# Patient Record
Sex: FEMALE | Race: BLACK OR AFRICAN AMERICAN | Employment: UNEMPLOYED | URBAN - METROPOLITAN AREA
[De-identification: names, ages, dates, MRNs, and addresses within clinical notes are randomized per-mention and may not be internally consistent; named-entity substitution may affect disease eponyms.]

---

## 2021-09-21 ENCOUNTER — HOSPITAL ENCOUNTER (EMERGENCY)
Age: 71
Discharge: LEFT BEFORE DISCHARGE | End: 2021-09-21

## 2022-08-26 ENCOUNTER — HOSPITAL ENCOUNTER (INPATIENT)
Age: 72
LOS: 1 days | Discharge: HOME | DRG: 897 | End: 2022-08-27
Attending: EMERGENCY MEDICINE | Admitting: STUDENT IN AN ORGANIZED HEALTH CARE EDUCATION/TRAINING PROGRAM
Payer: MEDICARE

## 2022-08-26 ENCOUNTER — APPOINTMENT (OUTPATIENT)
Dept: GENERAL RADIOLOGY | Age: 72
DRG: 897 | End: 2022-08-26
Payer: MEDICARE

## 2022-08-26 DIAGNOSIS — E87.29 ALCOHOLIC KETOACIDOSIS: ICD-10-CM

## 2022-08-26 DIAGNOSIS — E16.2 HYPOGLYCEMIA: ICD-10-CM

## 2022-08-26 DIAGNOSIS — E87.6 HYPOKALEMIA: Primary | ICD-10-CM

## 2022-08-26 PROBLEM — F10.121 ALCOHOL INTOXICATION DELIRIUM (HCC): Status: ACTIVE | Noted: 2022-08-26

## 2022-08-26 PROBLEM — E16.1 HYPOGLYCEMIA, KETOTIC: Status: ACTIVE | Noted: 2022-08-26

## 2022-08-26 PROBLEM — F33.9 EPISODE OF RECURRENT MAJOR DEPRESSIVE DISORDER (HCC): Status: ACTIVE | Noted: 2022-08-26

## 2022-08-26 PROBLEM — F10.10 ALCOHOL ABUSE: Status: ACTIVE | Noted: 2022-08-26

## 2022-08-26 PROBLEM — S42.412A LEFT SUPRACONDYLAR HUMERUS FRACTURE: Status: ACTIVE | Noted: 2021-10-04

## 2022-08-26 LAB
ABSOLUTE EOS #: <0.03 K/UL (ref 0–0.44)
ABSOLUTE IMMATURE GRANULOCYTE: 0.07 K/UL (ref 0–0.3)
ABSOLUTE LYMPH #: 1.09 K/UL (ref 1.1–3.7)
ABSOLUTE MONO #: 0.9 K/UL (ref 0.1–1.2)
ALBUMIN SERPL-MCNC: 4.2 G/DL (ref 3.5–5.2)
ALBUMIN/GLOBULIN RATIO: 1.4 (ref 1–2.5)
ALP BLD-CCNC: 60 U/L (ref 35–104)
ALT SERPL-CCNC: 15 U/L (ref 5–33)
ANION GAP SERPL CALCULATED.3IONS-SCNC: 11 MMOL/L (ref 9–17)
ANION GAP SERPL CALCULATED.3IONS-SCNC: 23 MMOL/L (ref 9–17)
AST SERPL-CCNC: 41 U/L
BASOPHILS # BLD: 0 % (ref 0–2)
BASOPHILS ABSOLUTE: 0.04 K/UL (ref 0–0.2)
BETA-HYDROXYBUTYRATE: 1.46 MMOL/L (ref 0.02–0.27)
BILIRUB SERPL-MCNC: 0.19 MG/DL (ref 0.3–1.2)
BILIRUBIN DIRECT: 0.12 MG/DL
BILIRUBIN URINE: NEGATIVE
BILIRUBIN, INDIRECT: 0.07 MG/DL (ref 0–1)
BUN BLDV-MCNC: 14 MG/DL (ref 8–23)
BUN BLDV-MCNC: 5 MG/DL (ref 8–23)
CALCIUM SERPL-MCNC: 7.7 MG/DL (ref 8.6–10.4)
CALCIUM SERPL-MCNC: 8.2 MG/DL (ref 8.6–10.4)
CHLORIDE BLD-SCNC: 102 MMOL/L (ref 98–107)
CHLORIDE BLD-SCNC: 105 MMOL/L (ref 98–107)
CO2: 13 MMOL/L (ref 20–31)
CO2: 22 MMOL/L (ref 20–31)
COLOR: YELLOW
COMMENT UA: ABNORMAL
CORTISOL: 26.3 UG/DL (ref 2.7–18.4)
CREAT SERPL-MCNC: 0.58 MG/DL (ref 0.5–0.9)
CREAT SERPL-MCNC: 0.7 MG/DL (ref 0.5–0.9)
EOSINOPHILS RELATIVE PERCENT: 0 % (ref 1–4)
ETHANOL PERCENT: 0.09 %
ETHANOL: 91 MG/DL
GFR AFRICAN AMERICAN: >60 ML/MIN
GFR AFRICAN AMERICAN: >60 ML/MIN
GFR NON-AFRICAN AMERICAN: >60 ML/MIN
GFR NON-AFRICAN AMERICAN: >60 ML/MIN
GFR SERPL CREATININE-BSD FRML MDRD: ABNORMAL ML/MIN/{1.73_M2}
GFR SERPL CREATININE-BSD FRML MDRD: ABNORMAL ML/MIN/{1.73_M2}
GLUCOSE BLD-MCNC: 116 MG/DL (ref 65–105)
GLUCOSE BLD-MCNC: 123 MG/DL (ref 65–105)
GLUCOSE BLD-MCNC: 143 MG/DL (ref 70–99)
GLUCOSE BLD-MCNC: 152 MG/DL (ref 65–105)
GLUCOSE BLD-MCNC: 157 MG/DL (ref 70–99)
GLUCOSE BLD-MCNC: 175 MG/DL (ref 65–105)
GLUCOSE URINE: ABNORMAL
HCT VFR BLD CALC: 37.5 % (ref 36.3–47.1)
HEMOGLOBIN: 11.3 G/DL (ref 11.9–15.1)
IMMATURE GRANULOCYTES: 1 %
INR BLD: 1
KETONES, URINE: ABNORMAL
LACTIC ACID, WHOLE BLOOD: 1.4 MMOL/L (ref 0.7–2.1)
LACTIC ACID, WHOLE BLOOD: 2 MMOL/L (ref 0.7–2.1)
LACTIC ACID, WHOLE BLOOD: 6.5 MMOL/L (ref 0.7–2.1)
LEUKOCYTE ESTERASE, URINE: NEGATIVE
LIPASE: 15 U/L (ref 13–60)
LYMPHOCYTES # BLD: 8 % (ref 24–43)
MAGNESIUM: 2.3 MG/DL (ref 1.6–2.6)
MCH RBC QN AUTO: 30.3 PG (ref 25.2–33.5)
MCHC RBC AUTO-ENTMCNC: 30.1 G/DL (ref 28.4–34.8)
MCV RBC AUTO: 100.5 FL (ref 82.6–102.9)
MONOCYTES # BLD: 6 % (ref 3–12)
NITRITE, URINE: NEGATIVE
NRBC AUTOMATED: 0 PER 100 WBC
PDW BLD-RTO: 14.6 % (ref 11.8–14.4)
PH UA: 5 (ref 5–8)
PLATELET # BLD: 334 K/UL (ref 138–453)
PMV BLD AUTO: 9.3 FL (ref 8.1–13.5)
POTASSIUM SERPL-SCNC: 2.7 MMOL/L (ref 3.7–5.3)
POTASSIUM SERPL-SCNC: 4.4 MMOL/L (ref 3.7–5.3)
PROTEIN UA: NEGATIVE
PROTHROMBIN TIME: 10.7 SEC (ref 9.1–12.3)
RBC # BLD: 3.73 M/UL (ref 3.95–5.11)
RBC # BLD: ABNORMAL 10*6/UL
SEG NEUTROPHILS: 85 % (ref 36–65)
SEGMENTED NEUTROPHILS ABSOLUTE COUNT: 12.03 K/UL (ref 1.5–8.1)
SODIUM BLD-SCNC: 135 MMOL/L (ref 135–144)
SODIUM BLD-SCNC: 141 MMOL/L (ref 135–144)
SPECIFIC GRAVITY UA: 1.01 (ref 1–1.03)
TOTAL PROTEIN: 7.1 G/DL (ref 6.4–8.3)
TSH SERPL DL<=0.05 MIU/L-ACNC: 0.54 UIU/ML (ref 0.3–5)
TURBIDITY: CLEAR
URINE HGB: NEGATIVE
UROBILINOGEN, URINE: NORMAL
WBC # BLD: 14.1 K/UL (ref 3.5–11.3)

## 2022-08-26 PROCEDURE — 71045 X-RAY EXAM CHEST 1 VIEW: CPT

## 2022-08-26 PROCEDURE — 82947 ASSAY GLUCOSE BLOOD QUANT: CPT

## 2022-08-26 PROCEDURE — 99223 1ST HOSP IP/OBS HIGH 75: CPT | Performed by: FAMILY MEDICINE

## 2022-08-26 PROCEDURE — 6370000000 HC RX 637 (ALT 250 FOR IP): Performed by: STUDENT IN AN ORGANIZED HEALTH CARE EDUCATION/TRAINING PROGRAM

## 2022-08-26 PROCEDURE — 2500000003 HC RX 250 WO HCPCS: Performed by: FAMILY MEDICINE

## 2022-08-26 PROCEDURE — 84443 ASSAY THYROID STIM HORMONE: CPT

## 2022-08-26 PROCEDURE — 82010 KETONE BODYS QUAN: CPT

## 2022-08-26 PROCEDURE — 2580000003 HC RX 258: Performed by: STUDENT IN AN ORGANIZED HEALTH CARE EDUCATION/TRAINING PROGRAM

## 2022-08-26 PROCEDURE — 83605 ASSAY OF LACTIC ACID: CPT

## 2022-08-26 PROCEDURE — 85610 PROTHROMBIN TIME: CPT

## 2022-08-26 PROCEDURE — 96374 THER/PROPH/DIAG INJ IV PUSH: CPT

## 2022-08-26 PROCEDURE — 1200000000 HC SEMI PRIVATE

## 2022-08-26 PROCEDURE — 2580000003 HC RX 258: Performed by: HEALTH CARE PROVIDER

## 2022-08-26 PROCEDURE — 80076 HEPATIC FUNCTION PANEL: CPT

## 2022-08-26 PROCEDURE — 80048 BASIC METABOLIC PNL TOTAL CA: CPT

## 2022-08-26 PROCEDURE — 87086 URINE CULTURE/COLONY COUNT: CPT

## 2022-08-26 PROCEDURE — 94761 N-INVAS EAR/PLS OXIMETRY MLT: CPT

## 2022-08-26 PROCEDURE — 96375 TX/PRO/DX INJ NEW DRUG ADDON: CPT

## 2022-08-26 PROCEDURE — 83690 ASSAY OF LIPASE: CPT

## 2022-08-26 PROCEDURE — 6370000000 HC RX 637 (ALT 250 FOR IP): Performed by: FAMILY MEDICINE

## 2022-08-26 PROCEDURE — 6360000002 HC RX W HCPCS: Performed by: FAMILY MEDICINE

## 2022-08-26 PROCEDURE — 83735 ASSAY OF MAGNESIUM: CPT

## 2022-08-26 PROCEDURE — G0480 DRUG TEST DEF 1-7 CLASSES: HCPCS

## 2022-08-26 PROCEDURE — 81003 URINALYSIS AUTO W/O SCOPE: CPT

## 2022-08-26 PROCEDURE — 85025 COMPLETE CBC W/AUTO DIFF WBC: CPT

## 2022-08-26 PROCEDURE — 82533 TOTAL CORTISOL: CPT

## 2022-08-26 PROCEDURE — 6360000002 HC RX W HCPCS: Performed by: STUDENT IN AN ORGANIZED HEALTH CARE EDUCATION/TRAINING PROGRAM

## 2022-08-26 PROCEDURE — 6360000002 HC RX W HCPCS: Performed by: EMERGENCY MEDICINE

## 2022-08-26 PROCEDURE — 36415 COLL VENOUS BLD VENIPUNCTURE: CPT

## 2022-08-26 PROCEDURE — 99285 EMERGENCY DEPT VISIT HI MDM: CPT

## 2022-08-26 RX ORDER — DEXTROSE MONOHYDRATE 100 MG/ML
INJECTION, SOLUTION INTRAVENOUS CONTINUOUS PRN
Status: DISCONTINUED | OUTPATIENT
Start: 2022-08-26 | End: 2022-08-27 | Stop reason: HOSPADM

## 2022-08-26 RX ORDER — MAGNESIUM SULFATE 1 G/100ML
1000 INJECTION INTRAVENOUS PRN
Status: DISCONTINUED | OUTPATIENT
Start: 2022-08-26 | End: 2022-08-27 | Stop reason: HOSPADM

## 2022-08-26 RX ORDER — DEXTROSE, SODIUM CHLORIDE, AND POTASSIUM CHLORIDE 5; .45; .15 G/100ML; G/100ML; G/100ML
INJECTION INTRAVENOUS CONTINUOUS
Status: DISCONTINUED | OUTPATIENT
Start: 2022-08-26 | End: 2022-08-27

## 2022-08-26 RX ORDER — SODIUM CHLORIDE 0.9 % (FLUSH) 0.9 %
10 SYRINGE (ML) INJECTION PRN
Status: DISCONTINUED | OUTPATIENT
Start: 2022-08-26 | End: 2022-08-27 | Stop reason: HOSPADM

## 2022-08-26 RX ORDER — ENOXAPARIN SODIUM 100 MG/ML
40 INJECTION SUBCUTANEOUS DAILY
Status: DISCONTINUED | OUTPATIENT
Start: 2022-08-26 | End: 2022-08-27 | Stop reason: HOSPADM

## 2022-08-26 RX ORDER — POTASSIUM CHLORIDE 7.45 MG/ML
40 INJECTION INTRAVENOUS ONCE
Status: COMPLETED | OUTPATIENT
Start: 2022-08-26 | End: 2022-08-26

## 2022-08-26 RX ORDER — ONDANSETRON 2 MG/ML
4 INJECTION INTRAMUSCULAR; INTRAVENOUS EVERY 6 HOURS PRN
Status: DISCONTINUED | OUTPATIENT
Start: 2022-08-26 | End: 2022-08-27 | Stop reason: HOSPADM

## 2022-08-26 RX ORDER — POTASSIUM CHLORIDE 20 MEQ/1
40 TABLET, EXTENDED RELEASE ORAL PRN
Status: DISCONTINUED | OUTPATIENT
Start: 2022-08-26 | End: 2022-08-27 | Stop reason: HOSPADM

## 2022-08-26 RX ORDER — POTASSIUM CHLORIDE 7.45 MG/ML
10 INJECTION INTRAVENOUS PRN
Status: DISCONTINUED | OUTPATIENT
Start: 2022-08-26 | End: 2022-08-27 | Stop reason: HOSPADM

## 2022-08-26 RX ORDER — SERTRALINE HYDROCHLORIDE 25 MG/1
25 TABLET, FILM COATED ORAL DAILY
Status: DISCONTINUED | OUTPATIENT
Start: 2022-08-26 | End: 2022-08-27 | Stop reason: HOSPADM

## 2022-08-26 RX ORDER — SENNOSIDES 8.6 MG
650 CAPSULE ORAL EVERY 8 HOURS PRN
Status: ON HOLD | COMMUNITY
End: 2022-08-27 | Stop reason: HOSPADM

## 2022-08-26 RX ORDER — SODIUM CHLORIDE 0.9 % (FLUSH) 0.9 %
5-40 SYRINGE (ML) INJECTION EVERY 12 HOURS SCHEDULED
Status: DISCONTINUED | OUTPATIENT
Start: 2022-08-26 | End: 2022-08-27 | Stop reason: HOSPADM

## 2022-08-26 RX ORDER — SODIUM CHLORIDE 9 MG/ML
INJECTION, SOLUTION INTRAVENOUS PRN
Status: DISCONTINUED | OUTPATIENT
Start: 2022-08-26 | End: 2022-08-27 | Stop reason: HOSPADM

## 2022-08-26 RX ORDER — ACETAMINOPHEN 325 MG/1
650 TABLET ORAL EVERY 6 HOURS PRN
Status: DISCONTINUED | OUTPATIENT
Start: 2022-08-26 | End: 2022-08-27 | Stop reason: HOSPADM

## 2022-08-26 RX ORDER — LORAZEPAM 0.5 MG/1
0.5 TABLET ORAL EVERY 4 HOURS PRN
Status: DISCONTINUED | OUTPATIENT
Start: 2022-08-26 | End: 2022-08-27 | Stop reason: HOSPADM

## 2022-08-26 RX ORDER — ACETAMINOPHEN 650 MG/1
650 SUPPOSITORY RECTAL EVERY 6 HOURS PRN
Status: DISCONTINUED | OUTPATIENT
Start: 2022-08-26 | End: 2022-08-27 | Stop reason: HOSPADM

## 2022-08-26 RX ORDER — ALBUTEROL SULFATE 90 UG/1
2 AEROSOL, METERED RESPIRATORY (INHALATION) EVERY 6 HOURS PRN
Status: ON HOLD | COMMUNITY
End: 2022-08-27 | Stop reason: HOSPADM

## 2022-08-26 RX ORDER — 0.9 % SODIUM CHLORIDE 0.9 %
1000 INTRAVENOUS SOLUTION INTRAVENOUS ONCE
Status: COMPLETED | OUTPATIENT
Start: 2022-08-26 | End: 2022-08-26

## 2022-08-26 RX ORDER — MAGNESIUM SULFATE IN WATER 40 MG/ML
2000 INJECTION, SOLUTION INTRAVENOUS ONCE
Status: COMPLETED | OUTPATIENT
Start: 2022-08-26 | End: 2022-08-26

## 2022-08-26 RX ORDER — ONDANSETRON 2 MG/ML
4 INJECTION INTRAMUSCULAR; INTRAVENOUS ONCE
Status: COMPLETED | OUTPATIENT
Start: 2022-08-26 | End: 2022-08-26

## 2022-08-26 RX ORDER — SODIUM CHLORIDE, SODIUM LACTATE, POTASSIUM CHLORIDE, AND CALCIUM CHLORIDE .6; .31; .03; .02 G/100ML; G/100ML; G/100ML; G/100ML
1000 INJECTION, SOLUTION INTRAVENOUS ONCE
Status: COMPLETED | OUTPATIENT
Start: 2022-08-26 | End: 2022-08-26

## 2022-08-26 RX ORDER — POLYETHYLENE GLYCOL 3350 17 G/17G
17 POWDER, FOR SOLUTION ORAL DAILY PRN
Status: DISCONTINUED | OUTPATIENT
Start: 2022-08-26 | End: 2022-08-27 | Stop reason: HOSPADM

## 2022-08-26 RX ORDER — ONDANSETRON 4 MG/1
4 TABLET, ORALLY DISINTEGRATING ORAL EVERY 8 HOURS PRN
Status: DISCONTINUED | OUTPATIENT
Start: 2022-08-26 | End: 2022-08-27 | Stop reason: HOSPADM

## 2022-08-26 RX ADMIN — ENOXAPARIN SODIUM 40 MG: 100 INJECTION SUBCUTANEOUS at 12:11

## 2022-08-26 RX ADMIN — ONDANSETRON 4 MG: 2 INJECTION INTRAMUSCULAR; INTRAVENOUS at 08:28

## 2022-08-26 RX ADMIN — SODIUM CHLORIDE 1000 ML: 9 INJECTION, SOLUTION INTRAVENOUS at 07:33

## 2022-08-26 RX ADMIN — POTASSIUM CHLORIDE, DEXTROSE MONOHYDRATE AND SODIUM CHLORIDE: 150; 5; 450 INJECTION, SOLUTION INTRAVENOUS at 13:13

## 2022-08-26 RX ADMIN — POTASSIUM BICARBONATE 40 MEQ: 782 TABLET, EFFERVESCENT ORAL at 07:28

## 2022-08-26 RX ADMIN — POTASSIUM CHLORIDE 40 MEQ: 7.46 INJECTION, SOLUTION INTRAVENOUS at 07:34

## 2022-08-26 RX ADMIN — MAGNESIUM SULFATE HEPTAHYDRATE 2000 MG: 40 INJECTION, SOLUTION INTRAVENOUS at 07:32

## 2022-08-26 RX ADMIN — SERTRALINE 25 MG: 25 TABLET, FILM COATED ORAL at 12:54

## 2022-08-26 RX ADMIN — POTASSIUM CHLORIDE, DEXTROSE MONOHYDRATE AND SODIUM CHLORIDE: 150; 5; 450 INJECTION, SOLUTION INTRAVENOUS at 22:56

## 2022-08-26 RX ADMIN — SODIUM CHLORIDE, POTASSIUM CHLORIDE, SODIUM LACTATE AND CALCIUM CHLORIDE 1000 ML: 600; 310; 30; 20 INJECTION, SOLUTION INTRAVENOUS at 09:50

## 2022-08-26 ASSESSMENT — ENCOUNTER SYMPTOMS
ABDOMINAL PAIN: 0
BLOOD IN STOOL: 0
WHEEZING: 0
EYE ITCHING: 0
VOMITING: 0
SINUS PRESSURE: 0
COUGH: 0
EYE PAIN: 0
RHINORRHEA: 0
NAUSEA: 0
SHORTNESS OF BREATH: 0
SORE THROAT: 0
CHEST TIGHTNESS: 0
CONSTIPATION: 0
DIARRHEA: 0
SINUS PAIN: 0
ABDOMINAL DISTENTION: 0

## 2022-08-26 ASSESSMENT — PAIN - FUNCTIONAL ASSESSMENT
PAIN_FUNCTIONAL_ASSESSMENT: NONE - DENIES PAIN
PAIN_FUNCTIONAL_ASSESSMENT: NONE - DENIES PAIN

## 2022-08-26 ASSESSMENT — PAIN SCALES - GENERAL
PAINLEVEL_OUTOF10: 0
PAINLEVEL_OUTOF10: 0

## 2022-08-26 NOTE — PLAN OF CARE
MICU consulted for high lactate. Patient presenting for \"hypoglycemia and alcohol intoxication. \"    Patient reports she collapsed on the ground last night and EMS was called, has never had any previous issues with her blood sugar or falls. Admits to drinking 3 beers daily. Denies any current pain or complaints. Is appropriately concerned about the syncopal-like episode. Vitals:    08/26/22 0830   BP: 118/65   Pulse: 81   Resp: 15   Temp:    SpO2: 100%   T 97.6F    GENERAL: No acute distress, alert, cooperative, oriented x 4  HEENT: normocephalic, atraumatic. Anicteric sclerae, normal conjunctiva. Neck supple. CV: Regular rate, no murmurs, rubs, or gallops  Pulm: CTAB, no accessory muscle use, mild wheezing, no respiratory distress. ABD: Soft, nondistended, mild rlq pain, asking for food  Neuro: CN2-12 grossly intact, oriented x 4, alert, sensation intact across extremities. MSK:  no deformities or injuries, moves all extremities equally  Skin: Warm, dry, cap refill 2-3 seconds  Psych: Normal mood      Recommending patient be admitted to step down  Repeat lactate planned, not collected. D/w intermed attending.     Cal Howell DO

## 2022-08-26 NOTE — ED PROVIDER NOTES
[facility]  Emergency Department  Emergency Medicine Resident Sign-out     Care of Alethia Galeazzi was assumed from Dr. Stephani Urrutia and is being seen for Hypoglycemia and Alcohol Intoxication  . The patient's initial evaluation and plan have been discussed with the prior provider who initially evaluated the patient.      EMERGENCY DEPARTMENT COURSE / MEDICAL DECISION MAKING:       MEDICATIONS GIVEN:  Orders Placed This Encounter   Medications    glucose chewable tablet 16 g    OR Linked Order Group     dextrose bolus 10% 125 mL     dextrose bolus 10% 250 mL    glucagon (rDNA) injection 1 mg    dextrose 10 % infusion    potassium bicarb-citric acid (EFFER-K) effervescent tablet 40 mEq    potassium chloride 10 mEq/100 mL IVPB (Peripheral Line)    magnesium sulfate 2000 mg in 50 mL IVPB premix    ondansetron (ZOFRAN) injection 4 mg       LABS / RADIOLOGY:     Labs Reviewed   CBC WITH AUTO DIFFERENTIAL - Abnormal; Notable for the following components:       Result Value    WBC 14.1 (*)     RBC 3.73 (*)     Hemoglobin 11.3 (*)     RDW 14.6 (*)     Seg Neutrophils 85 (*)     Lymphocytes 8 (*)     Eosinophils % 0 (*)     Immature Granulocytes 1 (*)     Segs Absolute 12.03 (*)     Absolute Lymph # 1.09 (*)     All other components within normal limits   BASIC METABOLIC PANEL - Abnormal; Notable for the following components:    Glucose 143 (*)     Calcium 8.2 (*)     Potassium 2.7 (*)     CO2 13 (*)     Anion Gap 23 (*)     All other components within normal limits   ETHANOL - Abnormal; Notable for the following components:    Ethanol 91 (*)     Ethanol percent 0.091 (*)     All other components within normal limits   BETA-HYDROXYBUTYRATE - Abnormal; Notable for the following components:    Beta-Hydroxybutyrate 1.46 (*)     All other components within normal limits   POC GLUCOSE FINGERSTICK - Abnormal; Notable for the following components:    POC Glucose 175 (*)     All other components within normal limits   TSH WITH REFLEX   URINALYSIS WITH REFLEX TO CULTURE   KETONES, URINE   CORTISOL TOTAL   HEPATIC FUNCTION PANEL   PROTIME-INR   LIPASE       No results found. RECENT VITALS:     Temp: 97.6 °F (36.4 °C),  Heart Rate: 80, Resp: 18, BP: 120/68, SpO2: 97 %      This patient is a 81 y.o. Female with altered mental status. Patient lives with her daughter who reported that the patient was less responsive and acting out of character. She called EMS and the patient was found to have critically low blood sugar. Her mentation improved significantly with IV thiamine and dextrose. She arrived with no complaints. Blood sugar was 175. She has no history of diabetes or hypoglycemic episodes. History of alcohol use. Reports very little food intake yesterday. ED work-up demonstrates critical hypokalemia. She is being replenished with 40 mEq p.o. and 40 mEq IV. Will require admission for critical hypokalemia and management of alcoholic ketoacidosis. ED Course as of 08/26/22 1116   Fri Aug 26, 2022   0627 POC Glucose(!): 175 [LR]   0649 WBC(!): 14.1 [LR]   0649 Hemoglobin Quant(!): 11.3 [LR]   0700 Potassium(!!): 2.7 [LR]   0700 Beta-Hydroxybutyrate(!): 1.46 [LR]      ED Course User Index  [LR] Florinda Pagan,        OUTSTANDING TASKS / RECOMMENDATIONS:    F/u additional labs  Awaiting Intermed  Dispo     FINAL IMPRESSION:     1. Hypokalemia    2. Hypoglycemia    3. Alcoholic ketoacidosis        DISPOSITION:         DISPOSITION:  []  Discharge   []  Transfer -    [x]  Admission -     []  Against Medical Advice   []  Eloped   FOLLOW-UP: No follow-up provider specified.    DISCHARGE MEDICATIONS: New Prescriptions    No medications on file          Lupe Smith MD  Emergency Medicine Resident  [address] [facility]       Lupe Smith MD  Resident  08/26/22 4692

## 2022-08-26 NOTE — PROGRESS NOTES
Advance Care Planning     Advance Care Planning Inpatient Note  [facility] Department    Today's Date: 8/26/2022  Unit: [facility]    Received request from Other:  . Upon review of chart and communication with care team, patient's decision making abilities are not in question. . Patient was/were present in the room during visit. Goals of ACP Conversation:  Discuss advance care planning documents  Facilitate a discussion related to patient's goals of care as they align with the patient's values and beliefs. Health Care Decision Makers:       Primary Decision Maker: Cheikh Valencia - Child - [phone]  Summary:  Completed New Documents    Advance Care Planning Documents (Patient Wishes):  Healthcare Power of /Advance Directive Appointment of Health Care Agent     Assessment:  Pt awake in ED bed and welcoming of visit. Pt was able to convey her wishes. She was uncertain of her children's phone #s so this was confirmed by Karyn Travis. .    Interventions:  Provided education on documents for clarity and greater understanding  Assisted in the completion of documents according to patient's wishes at this time  Encouraged ongoing ACP conversation with future decision makers and loved ones    Care Preferences Communicated:   Pt wants \"whatever can be done. \"    Outcomes/Plan:  ACP Discussion: Completed  New advance directive completed. Returned original document(s) to patient, as well as copies for distribution to appointed agents  Copy of advance directive given to staff to scan into medical record.     Electronically signed by Chaplain Maria Dolores on 8/26/2022 at 11:24 AM

## 2022-08-26 NOTE — ED NOTES
3rd bag of Potassium started at 9AM, patient is awake. HR is normal range, no chest pain.      Amber Olivas RN  08/26/22 2403

## 2022-08-26 NOTE — ED NOTES
Meal served at 1300hrs however, the patient consumed only about 10% of the meal. She had little appetite and doesn't really like the meal.     Pati Bingham RN  08/26/22 2835
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
## 2022-08-26 NOTE — ED NOTES
2nd  Bag Potassium IV started at 8:40AM HR is at 77bpm, patient is asleep.      Kirti Wild RN  08/26/22 6804

## 2022-08-26 NOTE — CARE COORDINATION
08/26/22 0910   Service Assessment   Patient Orientation Alert and Oriented   Cognition Alert   History Provided By Patient   Primary Caregiver [facility]  (pts daughter and nephew lives in pts home)   PCP Verified by CM Yes  (5 months ago)   Last Visit to PCP Within last 6 months   Prior Functional Level Independent in ADLs/IADLs   Current Functional Level Independent in ADLs/IADLs   Can patient return to prior living arrangement Yes   Ability to make needs known: Good   Family able to assist with home care needs: Yes   Would you like for me to discuss the discharge plan with any other family members/significant others, and if so, who? No   Financial Resources Medicare   Social/Functional History   Lives With Daughter   Type of [address] One level   [address] to enter with rails   Entrance Stairs - Number of Steps 5 steps, one rail   Bathroom Shower/Tub Tub/Shower unit   Bathroom Toilet Standard   Bathroom Accessibility Not accessible   [place]   Homemaking Responsibilities Yes   Ambulation Assistance Independent   Transfer Assistance Independent   Active  No   Patient's  Info children provide transportation although the pt can   Mode of Transportation Car;Truck   Occupation Unemployed   Discharge Planning   Type of [address] Prior To Admission None   Potential Assistance Needed N/A   DME Ordered? No   Potential Assistance Purchasing Medications No  (fills meds at [facility])   Type of [address] None   Patient expects to be discharged to: House   One/Two Story Residence One story   History of falls?  0   Services At/After Discharge   Services At/After Discharge None   Mode of Transport at Discharge   (family to provide transportation home)   Condition of Participation: Discharge Planning   The Plan for Transition of Care is related to the following treatment goals: comfort

## 2022-08-26 NOTE — ED PROVIDER NOTES
[facility]MED SURG  Emergency Department Encounter  Emergency Medicine Resident     Pt Isabell Figueroa  MRN: [id number]  [place] 1950  Date of evaluation: 8/26/22  PCP:  [address]       Chief Complaint   Patient presents with    Hypoglycemia    Alcohol Intoxication       HISTORY OF PRESENT ILLNESS  (Location/Symptom, Timing/Onset, Context/Setting, Quality, Duration, Modifying Factors, Severity.)      Negrito Zambrano is a 81 y.o. female who presents with hypoglycemia. Patient lives with her daughter and her daughter called EMS for altered mental status and decreased responsiveness. Patient has a history of alcohol use. Reports that she drank vodka earlier in the day and had very little p.o. intake of food throughout the day. Later in the evening she became altered and EMS was called. Thiamine was administered and she was given an amp of dextrose with significant improvement in mentation. She had been well throughout the day up until this point. The patient is alert and oriented on arrival and states that she does not have any significant medical conditions and does not take any medications at home. She does not have diabetes and has never had any episode of hypoglycemia. PAST MEDICAL / SURGICAL / SOCIAL / FAMILY HISTORY      has a past medical history of Alcohol abuse and Left supracondylar humerus fracture. has a past surgical history that includes Humerus fracture surgery (Left). Social History     Socioeconomic History    Marital status:      Spouse name: Not on file    Number of children: Not on file    Years of education: Not on file    Highest education level: Not on file   Occupational History    Not on file   Tobacco Use    Smoking status: Former     Types: Cigarettes    Smokeless tobacco: Never   Substance and Sexual Activity    Alcohol use:  Yes     Alcohol/week: 6.0 standard drinks     Types: 2 Cans of beer, 4 Shots of liquor per week    Drug use: Not Currently    Sexual activity: Not on file   Other Topics Concern    Not on file   Social History Narrative    Lives along with her daughter and nephew. Social Determinants of Health     Financial Resource Strain: Not on file   Food Insecurity: Not on file   Transportation Needs: Not on file   Physical Activity: Not on file   Stress: Not on file   Social Connections: Not on file   Intimate Partner Violence: Not on file   Housing Stability: Not on file       Family History   Problem Relation Age of Onset    Cirrhosis Father        Allergies:  Codeine    Home Medications:  Prior to Admission medications    Medication Sig Start Date End Date Taking? Authorizing Provider   gabapentin (NEURONTIN) 300 MG capsule Take 1 capsule by mouth 2 times daily for 180 days. Intended supply: 90 days 8/27/22 2/23/23 Yes Patricia Greenberg MD   albuterol sulfate HFA (PROVENTIL;VENTOLIN;PROAIR) 108 (90 Base) MCG/ACT inhaler Inhale 2 puffs into the lungs every 6 hours as needed  8/27/22  Historical Provider, MD   acetaminophen (TYLENOL) 650 MG extended release tablet Take 650 mg by mouth every 8 hours as needed  8/27/22  Historical Provider, MD       REVIEW OF SYSTEMS    (2-9 systems for level 4, 10 or more for level 5)      Review of Systems   Constitutional:  Negative for activity change, chills and fever. HENT:  Negative for congestion, sinus pressure, sinus pain and sore throat. Eyes:  Negative for pain and itching. Respiratory:  Negative for cough and shortness of breath. Cardiovascular:  Negative for chest pain. Gastrointestinal:  Negative for abdominal distention, abdominal pain, constipation, diarrhea and nausea. Endocrine: Negative for polyuria. Genitourinary:  Negative for dysuria and frequency. Musculoskeletal:  Negative for arthralgias. Skin:  Negative for rash. Neurological:  Negative for light-headedness and headaches.      PHYSICAL EXAM   (up to 7 for level 4, 8 or more for level 5)      INITIAL VITALS:   BP (!) 149/79   Pulse 80   Temp 98.1 °F (36.7 °C) (Oral)   Resp 16   Ht 5' 4\" (1.626 m)   Wt 107 lb (48.5 kg)   SpO2 99%   BMI 18.37 kg/m²     Physical Exam  Vitals reviewed. Constitutional:       General: She is not in acute distress. HENT:      Head: Normocephalic and atraumatic. Ears:      Comments: Hearing grossly normal     Nose: Nose normal.      Mouth/Throat:      Mouth: Mucous membranes are moist.      Pharynx: Oropharynx is clear. Eyes:      General: No scleral icterus. Conjunctiva/sclera: Conjunctivae normal.      Pupils: Pupils are equal, round, and reactive to light. Cardiovascular:      Rate and Rhythm: Normal rate and regular rhythm. Pulses: Normal pulses. Pulmonary:      Effort: Pulmonary effort is normal. No respiratory distress. Breath sounds: Normal breath sounds. Abdominal:      General: There is no distension. Palpations: Abdomen is soft. Tenderness: There is no abdominal tenderness. There is no guarding. Musculoskeletal:      Cervical back: No muscular tenderness. Right lower leg: No edema. Left lower leg: No edema. Skin:     General: Skin is warm and dry. Capillary Refill: Capillary refill takes less than 2 seconds. Neurological:      General: No focal deficit present. Mental Status: She is alert and oriented to person, place, and time. Mental status is at baseline.       Comments: No facial droop, 5/5 strength throughout, GCS 15, A&O x4,        DIFFERENTIAL  DIAGNOSIS     PLAN (LABS / IMAGING / EKG):  Orders Placed This Encounter   Procedures    Culture, Urine    XR CHEST PORTABLE    CBC with Auto Differential    Basic Metabolic Panel    Ethanol    Beta-Hydroxybutyrate    TSH with Reflex    Urinalysis with Reflex to Culture    Cortisol Total    Hepatic Function Panel    Protime-INR    Lipase    Lactic Acid    Magnesium    Basic Metabolic Panel w/ Reflex to MG    Lactic Acid Protime-INR    Height and weight    Inpatient consult to Internal Medicine    Inpatient consult to Critical Care    POC Glucose Fingerstick    POC Glucose Fingerstick    POC Glucose Fingerstick    POC Glucose Fingerstick    POC Glucose Fingerstick    ADMIT TO INPATIENT    Discharge patient       MEDICATIONS ORDERED:  Orders Placed This Encounter   Medications    DISCONTD: glucose chewable tablet 16 g    DISCONTD: dextrose bolus 10% 125 mL    DISCONTD: dextrose bolus 10% 250 mL    DISCONTD: glucagon (rDNA) injection 1 mg    DISCONTD: dextrose 10 % infusion    DISCONTD: potassium bicarb-citric acid (EFFER-K) effervescent tablet 40 mEq    potassium chloride 10 mEq/100 mL IVPB (Peripheral Line)    magnesium sulfate 2000 mg in 50 mL IVPB premix    ondansetron (ZOFRAN) injection 4 mg    0.9 % sodium chloride bolus    lactated ringers bolus    DISCONTD: sodium chloride flush 0.9 % injection 5-40 mL    DISCONTD: sodium chloride flush 0.9 % injection 10 mL    DISCONTD: 0.9 % sodium chloride infusion    DISCONTD: potassium chloride (KLOR-CON M) extended release tablet 40 mEq    DISCONTD: potassium bicarb-citric acid (EFFER-K) effervescent tablet 40 mEq    DISCONTD: potassium chloride 10 mEq/100 mL IVPB (Peripheral Line)    DISCONTD: magnesium sulfate 1000 mg in dextrose 5% 100 mL IVPB    DISCONTD: enoxaparin (LOVENOX) injection 40 mg     Order Specific Question:   Indication of Use     Answer:   Prophylaxis-DVT/PE    DISCONTD: ondansetron (ZOFRAN-ODT) disintegrating tablet 4 mg    DISCONTD: ondansetron (ZOFRAN) injection 4 mg    DISCONTD: polyethylene glycol (GLYCOLAX) packet 17 g    DISCONTD: acetaminophen (TYLENOL) tablet 650 mg    DISCONTD: acetaminophen (TYLENOL) suppository 650 mg    DISCONTD: dextrose 5 % and 0.45 % NaCl with KCl 20 mEq infusion    DISCONTD: LORazepam (ATIVAN) tablet 0.5 mg    DISCONTD: sertraline (ZOLOFT) tablet 25 mg    DISCONTD: chlordiazePOXIDE (LIBRIUM) capsule 10 mg    DISCONTD: thiamine (B-1) 100 mg in sodium chloride 0.9 % 100 mL IVPB    DISCONTD: folic acid injection 1 mg    DISCONTD: dextrose 5 % in lactated ringers infusion    DISCONTD: folic acid 1 mg, thiamine (B-1) 100 mg in sodium chloride 0.9 % 50 mL IVPB    gabapentin (NEURONTIN) 300 MG capsule     Sig: Take 1 capsule by mouth 2 times daily for 180 days. Intended supply: 90 days     Dispense:  180 capsule     Refill:  1       DIAGNOSTIC RESULTS / EMERGENCY DEPARTMENT COURSE / MDM   LAB RESULTS:  Results for orders placed or performed during the hospital encounter of 08/26/22   Culture, Urine    Specimen: Urine, clean catch   Result Value Ref Range    Specimen Description . CLEAN CATCH URINE     Culture NO SIGNIFICANT GROWTH    CBC with Auto Differential   Result Value Ref Range    WBC 14.1 (H) 3.5 - 11.3 k/uL    RBC 3.73 (L) 3.95 - 5.11 m/uL    Hemoglobin 11.3 (L) 11.9 - 15.1 g/dL    Hematocrit 37.5 36.3 - 47.1 %    .5 82.6 - 102.9 fL    MCH 30.3 25.2 - 33.5 pg    MCHC 30.1 28.4 - 34.8 g/dL    RDW 14.6 (H) 11.8 - 14.4 %    Platelets 662 690 - 413 k/uL    MPV 9.3 8.1 - 13.5 fL    NRBC Automated 0.0 0.0 per 100 WBC    Seg Neutrophils 85 (H) 36 - 65 %    Lymphocytes 8 (L) 24 - 43 %    Monocytes 6 3 - 12 %    Eosinophils % 0 (L) 1 - 4 %    Basophils 0 0 - 2 %    Immature Granulocytes 1 (H) 0 %    Segs Absolute 12.03 (H) 1.50 - 8.10 k/uL    Absolute Lymph # 1.09 (L) 1.10 - 3.70 k/uL    Absolute Mono # 0.90 0.10 - 1.20 k/uL    Absolute Eos # <0.03 0.00 - 0.44 k/uL    Basophils Absolute 0.04 0.00 - 0.20 k/uL    Absolute Immature Granulocyte 0.07 0.00 - 0.30 k/uL    RBC Morphology ANISOCYTOSIS PRESENT    Basic Metabolic Panel   Result Value Ref Range    Glucose 143 (H) 70 - 99 mg/dL    BUN 14 8 - 23 mg/dL    Creatinine 0.70 0.50 - 0.90 mg/dL    Calcium 8.2 (L) 8.6 - 10.4 mg/dL    Sodium 141 135 - 144 mmol/L    Potassium 2.7 (LL) 3.7 - 5.3 mmol/L    Chloride 105 98 - 107 mmol/L    CO2 13 (L) 20 - 31 mmol/L    Anion Gap 23 (H) 9 - 17 mmol/L    GFR Creatinine 0.58 0.50 - 0.90 mg/dL    Calcium 7.7 (L) 8.6 - 10.4 mg/dL    Sodium 135 135 - 144 mmol/L    Potassium 4.4 3.7 - 5.3 mmol/L    Chloride 102 98 - 107 mmol/L    CO2 22 20 - 31 mmol/L    Anion Gap 11 9 - 17 mmol/L    GFR Non-African American >60 >60 mL/min    GFR African American >60 >60 mL/min    GFR Comment         Lactic Acid   Result Value Ref Range    Lactic Acid, Whole Blood 1.4 0.7 - 2.1 mmol/L   Lactic Acid   Result Value Ref Range    Lactic Acid, Whole Blood 2.0 0.7 - 2.1 mmol/L   Basic Metabolic Panel w/ Reflex to MG   Result Value Ref Range    Glucose 147 (H) 70 - 99 mg/dL    BUN 4 (L) 8 - 23 mg/dL    Creatinine 0.61 0.50 - 0.90 mg/dL    Calcium 7.7 (L) 8.6 - 10.4 mg/dL    Sodium 134 (L) 135 - 144 mmol/L    Potassium 4.3 3.7 - 5.3 mmol/L    Chloride 104 98 - 107 mmol/L    CO2 20 20 - 31 mmol/L    Anion Gap 10 9 - 17 mmol/L    GFR Non-African American >60 >60 mL/min    GFR African American >60 >60 mL/min    GFR Comment         Protime-INR   Result Value Ref Range    Protime 11.1 9.1 - 12.3 sec    INR 1.0    POC Glucose Fingerstick   Result Value Ref Range    POC Glucose 175 (H) 65 - 105 mg/dL   POC Glucose Fingerstick   Result Value Ref Range    POC Glucose 123 (H) 65 - 105 mg/dL   POC Glucose Fingerstick   Result Value Ref Range    POC Glucose 116 (H) 65 - 105 mg/dL   POC Glucose Fingerstick   Result Value Ref Range    POC Glucose 152 (H) 65 - 105 mg/dL   POC Glucose Fingerstick   Result Value Ref Range    POC Glucose 168 (H) 65 - 105 mg/dL       IMPRESSION: Nidhi Johnson is a 81 y.o. woman presenting for an episode of hypoglycemia and altered mental status now resolved. She denies taking any regular medications. Medical history does include alcohol abuse however patient reports no daily alcohol use. Denies prior withdrawal. Currently low concern for sepsis, MI, acute hepatic or renal failure. 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 RADIOLOGY:  XR CHEST PORTABLE   Final Result   No acute airspace disease identified. EKG  none    All EKG's are interpreted by the Emergency Department Physician who either signs or Co-signs this chart in the absence of a cardiologist.    EMERGENCY DEPARTMENT COURSE:      ED Course as of 08/27/22 2233   Fri Aug 26, 2022   0627 POC Glucose(!): 175 [LR]   0649 WBC(!): 14.1 [LR]   0649 Hemoglobin Quant(!): 11.3 [LR]   0700 Potassium(!!): 2.7 [LR]   0700 Beta-Hydroxybutyrate(!): 1.46 [LR]      ED Course User Index  [LR] Bola Valle DO   ED work-up demonstrates critical hypokalemia. Replenished with 40 mEq IV and p.o. Work-up also concerning for possible alcoholic ketoacidosis. Patient will be recommended for admission. Remaining biochemical work-up is not yet complete. Pending at the time of signout. No notes of EC Admission Criteria type on file. PROCEDURES:  none    CONSULTS:  IP CONSULT TO INTERNAL MEDICINE  IP CONSULT TO CRITICAL CARE    CRITICAL CARE:  See attending note        FINAL IMPRESSION      1. Hypokalemia    2. Hypoglycemia    3. Alcoholic ketoacidosis          DISPOSITION / PLAN     DISPOSITION Admitted 08/26/2022 10:24:18 AM      PATIENT REFERRED TO:  No follow-up provider specified. DISCHARGE MEDICATIONS:  Discharge Medication List as of 8/27/2022  9:17 AM        START taking these medications    Details   gabapentin (NEURONTIN) 300 MG capsule Take 1 capsule by mouth 2 times daily for 180 days.  Intended supply: 90 days, Disp-180 capsule, R-1Normal             Bola Valle DO  Emergency Medicine Resident    (Please note that portions of thisnote were completed with a voice recognition program.  Efforts were made to edit the dictations but occasionally words are mis-transcribed.)      Bola Valle DO  Resident  08/27/22 [id number]

## 2022-08-26 NOTE — ED NOTES
Patient is awake, and coherent. IV patency Checked  before Started with  Magnesium  Sulfate IV on the right arm. IV potassium started on the left arm with free flowing 0.9Nacl. Odansetron also given for nausea and vomiting. Oral Potassium also given and was tolerated. Urine sample was also sent to lab around 7:30am. Patient HR is at 80bpm on the the start of Potassium correction.      Pati Bingham RN  08/26/22 [id number]

## 2022-08-26 NOTE — ED PROVIDER NOTES
[address] HANDOFF       Handoff taken on the following patient from prior Attending Physician:  Pt Name: Ashvin Sanon  PCP:  [id number] [address]  I was available and discussed any additional care issues that arose and coordinated the management plans with the resident(s) caring for the patient during my duty period. Any areas of disagreement with resident's documentation of care or procedures are noted on the chart. I was personally present for the key portions of any/all procedures during my duty period. I have documented in the chart those procedures where I was not present during the key portions. CHIEF COMPLAINT       Chief Complaint   Patient presents with    Hypoglycemia    Alcohol Intoxication         CURRENT MEDICATIONS     Previous Medications  Previous Medications    No medications on file       Encounter Medications  Orders Placed This Encounter   Medications    glucose chewable tablet 16 g    OR Linked Order Group     dextrose bolus 10% 125 mL     dextrose bolus 10% 250 mL    glucagon (rDNA) injection 1 mg    dextrose 10 % infusion    potassium bicarb-citric acid (EFFER-K) effervescent tablet 40 mEq    potassium chloride 10 mEq/100 mL IVPB (Peripheral Line)    magnesium sulfate 2000 mg in 50 mL IVPB premix    ondansetron (ZOFRAN) injection 4 mg       ALLERGIES     has No Known Allergies.       RECENT VITALS:   Temp: 97.6 °F (36.4 °C),  Heart Rate: 80, Resp: 18, BP: 120/68    RADIOLOGY:   XR CHEST PORTABLE    (Results Pending)       LABS:  Labs Reviewed   CBC WITH AUTO DIFFERENTIAL - Abnormal; Notable for the following components:       Result Value    WBC 14.1 (*)     RBC 3.73 (*)     Hemoglobin 11.3 (*)     RDW 14.6 (*)     Seg Neutrophils 85 (*)     Lymphocytes 8 (*)     Eosinophils % 0 (*)     Immature Granulocytes 1 (*)     Segs Absolute 12.03 (*)     Absolute Lymph # 1.09 (*)     All other components within normal limits   BASIC METABOLIC PANEL - Abnormal; Notable for the following components:    Glucose 143 (*)     Calcium 8.2 (*)     Potassium 2.7 (*)     CO2 13 (*)     Anion Gap 23 (*)     All other components within normal limits   ETHANOL - Abnormal; Notable for the following components:    Ethanol 91 (*)     Ethanol percent 0.091 (*)     All other components within normal limits   BETA-HYDROXYBUTYRATE - Abnormal; Notable for the following components:    Beta-Hydroxybutyrate 1.46 (*)     All other components within normal limits   POC GLUCOSE FINGERSTICK - Abnormal; Notable for the following components:    POC Glucose 175 (*)     All other components within normal limits   TSH WITH REFLEX   URINALYSIS WITH REFLEX TO CULTURE   KETONES, URINE   CORTISOL TOTAL   HEPATIC FUNCTION PANEL   PROTIME-INR   LIPASE     Patient arrives with altered mentation, probable loss of consciousness, incontinence of stool, no witnessed seizure activity, found to be hypoglycemic and improved with IV dextrose. She admits to occasionally drinking beer but not heavily. She is not on medications. She has occasional right-sided temporal headaches though not present right now. She admits to nausea. No fevers. No abdominal pain, vomiting, chest pain, shortness of breath, cough, sputum. She is found to be hypothermic. Laboratory studies reveal  Hypokalemia, mild hypocalcemia. Awaiting further work-up. Plan is continue monitoring blood sugar, replace potassium and magnesium, antiemetics for nausea, consider brain CT, will add on chest x-ray liver panel and urine for possible sepsis. Anticipate admission for potassium replacement. PLAN/ TASKS OUTSTANDING     Labs, monitor sugar, replace potassium, further work-up, admission for monitoring      (Please note that portions of this note were completed with a voice recognition program.  Efforts were made to edit the dictations but occasionally words are mis-transcribed. )    Jennifer Padilla MD,, MD, MONTY KRAMER   Attending Emergency Physician        Alvarez Villatoro MD  08/26/22 [id number]

## 2022-08-26 NOTE — ACP (ADVANCE CARE PLANNING)
Advance Care Planning     Advance Care Planning Activator (Inpatient)  Conversation Note      Date of ACP Conversation: 8/26/2022     Conversation Conducted with: Patient with Decision Making Capacity    ACP Activator: Jamie Castle RN    Pt relates she has not medical POA she has 4 children. She relates she would like Dutch Yoan her daughter to be her medical POA (PS to spiritual care for completion of paperwork), pt would like to remain a full code as she has no living will either    Health Care Decision Maker:     Current Designated Health Care Decision Maker:     Click here to complete [address] [place] [address] including section of the [address] [place] [address] Relationship (ie \"Primary\")  Today we documented Decision Maker(s) consistent with Legal Next of Kin hierarchy. Care Preferences    Ventilation: \"If you were in your present state of health and suddenly became very ill and were unable to breathe on your own, what would your preference be about the use of a ventilator (breathing machine) if it were available to you? \"      Would the patient desire the use of ventilator (breathing machine)?: yes    \"If your health worsens and it becomes clear that your chance of recovery is unlikely, what would your preference be about the use of a ventilator (breathing machine) if it were available to you? \"     Would the patient desire the use of ventilator (breathing machine)?: Yes      Resuscitation  \"CPR works best to restart the heart when there is a sudden event, like a heart attack, in someone who is otherwise healthy. Unfortunately, CPR does not typically restart the heart for people who have serious health conditions or who are very sick. \"    \"In the event your heart stopped as a result of an underlying serious health condition, would you want attempts to be made to restart your heart (answer \"yes\" for attempt to resuscitate) or would you prefer a natural death (answer \"no\" for do not attempt to resuscitate)? \" yes       [] Yes   [] No   Educated Patient / Elfredia Latin regarding differences between Advance Directives and portable DNR orders.     Length of ACP Conversation in minutes:      Conversation Outcomes:  [x] ACP discussion completed  [] Existing advance directive reviewed with patient; no changes to patient's previously recorded wishes  [] New Advance Directive completed  [] Portable Do Not Rescitate prepared for Provider review and signature  [] POLST/POST/MOLST/MOST prepared for Provider review and signature      Follow-up plan:    [x] Schedule follow-up conversation to continue planning  [] Referred individual to Provider for additional questions/concerns   [] Advised patient/agent/surrogate to review completed ACP document and update if needed with changes in condition, patient preferences or care setting    [] This note routed to one or more involved healthcare providers

## 2022-08-26 NOTE — ED NOTES
4th bag of IV potassium given at 11:30am. HR is at 85, patient is awake.      Denise Villafana RN  08/26/22 [id number]

## 2022-08-26 NOTE — ED NOTES
Patient arrived at ER vis [facility]2. Patient conscious and coherent a bet drowsy. She had an IV dextrous in her right arm. Hooked into monitor. Patient temp is 94. 4'F, keep her warm with blankets. She was unconscious when EMS found her, they said her sugar 'real low'. She gain consciousness after they gave them IV thiamine and Dextrose. Her sugar went up to 392 mg/dl. Had rechecked her sugar upon arriving at ED and was 175mg/dl.      Toribio Meza RN  08/26/22 [id number]

## 2022-08-26 NOTE — ED NOTES
The following labs were labeled with appropriate pt sticker and tubed to lab:     [] Blue     [x] Lavender   [] on ice  [x] Green/yellow  [x] Green/black [] on ice  [] Grey  [] on ice  [] Yellow  [] Red  [] Pink  [] ABG  [] VBG    [] COVID-19 swab    [] Rapid  [] PCR  [] Flu swab  [] Peds Viral Panel     [] Urine Sample  [] Pelvic Cultures  [] Blood Cultures   [] STREP Cultures       Ruth Bauman RN  08/26/22 7539

## 2022-08-26 NOTE — ED PROVIDER NOTES
[facility]   Emergency Department  Faculty Attestation       I performed a history and physical examination of the patient and discussed management with the resident. I reviewed the residents note and agree with the documented findings including all diagnostic interpretations and plan of care. Any areas of disagreement are noted on the chart. I was personally present for the key portions of any procedures. I have documented in the chart those procedures where I was not present during the key portions. I have reviewed the emergency nurses triage note. I agree with the chief complaint, past medical history, past surgical history, allergies, medications, social and family history as documented unless otherwise noted below. Documentation of the HPI, Physical Exam and Medical Decision Making performed by scribbenedicto is based on my personal performance of the HPI, PE and MDM. For Physician Assistant/ Nurse Practitioner cases/documentation I have personally evaluated this patient and have completed at least one if not all key elements of the E/M (history, physical exam, and MDM). Additional findings are as noted. Pertinent Comments     Primary Care Physician: Marvin Escobedo      ED Triage Vitals   BP Temp Temp Source Heart Rate Resp SpO2 Height Weight   08/26/22 0535 08/26/22 0537 08/26/22 0537 08/26/22 0535 08/26/22 0535 08/26/22 0537 -- --   119/72 (!) 94.4 °F (34.7 °C) Oral 86 17 97 %          This is a 81 y.o. female who presents to the Emergency Department per EMS after family found her acting abnormally. Found to have low glucose. Does admit to having alcohol on board today and taking very little oral intake. Has no h/o diabetes or hypoglycemia previously. Stated that she had been feeling well until this and does not have any significant complaints on my exam.   On exam she is resting in bed in no distress. Appears to feel ill, but nontoxic. NC/AT with dry mucus membranes.   Heart sounds rgular and lungs clear to auscultation. Abdomen is soft and nontender. Alert and oriented with no focal deficits. Normal sensation and strength. On my exam patient had been under the warm blankets and temperature had rapidly improved. Alcohol intoxication in chronic alcoholic with hypoglycemia. Mentation improved after EMS gave thiamine and glucose. Curretnly alert and oriented and appears mildly dehydrated  Initial temp was low but quickly corrected  Frequent gluc checks  Basic labs  UA  CXR  TSH   Re-eval   Signed out to oncoming physicina.      Cheyenne Rasmussen MD        Critical Care: None     Cheyenne Rasmussen MD  Attending Emergency Physician        Cheyenne Rasmussen MD  08/27/22 9433

## 2022-08-27 VITALS
RESPIRATION RATE: 16 BRPM | SYSTOLIC BLOOD PRESSURE: 149 MMHG | BODY MASS INDEX: 18.27 KG/M2 | TEMPERATURE: 98.1 F | OXYGEN SATURATION: 99 % | HEIGHT: 64 IN | HEART RATE: 80 BPM | DIASTOLIC BLOOD PRESSURE: 79 MMHG | WEIGHT: 107 LBS

## 2022-08-27 LAB
ANION GAP SERPL CALCULATED.3IONS-SCNC: 10 MMOL/L (ref 9–17)
BUN BLDV-MCNC: 4 MG/DL (ref 8–23)
CALCIUM SERPL-MCNC: 7.7 MG/DL (ref 8.6–10.4)
CHLORIDE BLD-SCNC: 104 MMOL/L (ref 98–107)
CO2: 20 MMOL/L (ref 20–31)
CREAT SERPL-MCNC: 0.61 MG/DL (ref 0.5–0.9)
CULTURE: NORMAL
GFR AFRICAN AMERICAN: >60 ML/MIN
GFR NON-AFRICAN AMERICAN: >60 ML/MIN
GFR SERPL CREATININE-BSD FRML MDRD: ABNORMAL ML/MIN/{1.73_M2}
GLUCOSE BLD-MCNC: 147 MG/DL (ref 70–99)
GLUCOSE BLD-MCNC: 168 MG/DL (ref 65–105)
INR BLD: 1
LACTIC ACID, WHOLE BLOOD: 2 MMOL/L (ref 0.7–2.1)
POTASSIUM SERPL-SCNC: 4.3 MMOL/L (ref 3.7–5.3)
PROTHROMBIN TIME: 11.1 SEC (ref 9.1–12.3)
SODIUM BLD-SCNC: 134 MMOL/L (ref 135–144)
SPECIMEN DESCRIPTION: NORMAL

## 2022-08-27 PROCEDURE — 6370000000 HC RX 637 (ALT 250 FOR IP): Performed by: STUDENT IN AN ORGANIZED HEALTH CARE EDUCATION/TRAINING PROGRAM

## 2022-08-27 PROCEDURE — 83605 ASSAY OF LACTIC ACID: CPT

## 2022-08-27 PROCEDURE — 82947 ASSAY GLUCOSE BLOOD QUANT: CPT

## 2022-08-27 PROCEDURE — 6370000000 HC RX 637 (ALT 250 FOR IP): Performed by: FAMILY MEDICINE

## 2022-08-27 PROCEDURE — 85610 PROTHROMBIN TIME: CPT

## 2022-08-27 PROCEDURE — 99232 SBSQ HOSP IP/OBS MODERATE 35: CPT | Performed by: STUDENT IN AN ORGANIZED HEALTH CARE EDUCATION/TRAINING PROGRAM

## 2022-08-27 PROCEDURE — 36415 COLL VENOUS BLD VENIPUNCTURE: CPT

## 2022-08-27 PROCEDURE — 80048 BASIC METABOLIC PNL TOTAL CA: CPT

## 2022-08-27 RX ORDER — GABAPENTIN 300 MG/1
300 CAPSULE ORAL 2 TIMES DAILY
Qty: 180 CAPSULE | Refills: 1 | Status: SHIPPED | OUTPATIENT
Start: 2022-08-27 | End: 2023-02-23

## 2022-08-27 RX ORDER — DEXTROSE, SODIUM CHLORIDE, SODIUM LACTATE, POTASSIUM CHLORIDE, AND CALCIUM CHLORIDE 5; .6; .31; .03; .02 G/100ML; G/100ML; G/100ML; G/100ML; G/100ML
INJECTION, SOLUTION INTRAVENOUS CONTINUOUS
Status: DISCONTINUED | OUTPATIENT
Start: 2022-08-27 | End: 2022-08-27 | Stop reason: HOSPADM

## 2022-08-27 RX ORDER — CHLORDIAZEPOXIDE HYDROCHLORIDE 5 MG/1
10 CAPSULE, GELATIN COATED ORAL 3 TIMES DAILY
Status: DISCONTINUED | OUTPATIENT
Start: 2022-08-27 | End: 2022-08-27 | Stop reason: HOSPADM

## 2022-08-27 RX ORDER — FOLIC ACID 5 MG/ML
1 INJECTION, SOLUTION INTRAMUSCULAR; INTRAVENOUS; SUBCUTANEOUS DAILY
Status: DISCONTINUED | OUTPATIENT
Start: 2022-08-27 | End: 2022-08-27

## 2022-08-27 RX ADMIN — SERTRALINE 25 MG: 25 TABLET, FILM COATED ORAL at 08:52

## 2022-08-27 RX ADMIN — CHLORDIAZEPOXIDE HYDROCHLORIDE 10 MG: 5 CAPSULE ORAL at 08:52

## 2022-08-27 RX ADMIN — POTASSIUM BICARBONATE 40 MEQ: 782 TABLET, EFFERVESCENT ORAL at 08:53

## 2022-08-27 ASSESSMENT — PAIN SCALES - GENERAL: PAINLEVEL_OUTOF10: 0

## 2022-08-27 NOTE — DISCHARGE SUMMARY
[facility]  Office: [address], DO, [address], DO, Arianne Tabor, DO, Zak Guardado Blood, DO, Waldo Flannery MD, Nery Last MD, Yazmin Edwards MD, Salbador Cornell MD,  Monik Daniels MD, Saida Zambrano MD, Jim Woody, DO, Jennie Middleton MD,  Nupur Davidson MD, Chanel Grossman MD, Vinh Bernal, DO, Harmeet Ricardo MD, Ly Hamilton MD, Thalia Payne MD, Gabe Kaplan DO, Nova Dutton MD, Mirtha Caba MD, Taylor Latham, [facility],  Heather Snow, [facility], Stef Ko, [facility], Katelyn De Leon, CNP, Rowan Oppenheim, PALOS, Silvestre Rodriges, [facility], Bianca Johnson, CNP, Elsy Verdin, CNP, Claudeen Quarry, CNP, Jax Rao, CNP, Chalo Dalton, CNP, Sandra Conti, CNS, Zev Chacko, [facility], Elaine Loyd, CNP, Andreas Guidry, [facility], Isacc Drummond, [facility]    Discharge Summary     Patient ID: John Quintana  :  1950   MRN: [id number]     ACCOUNT:  [de-identified]   Patient's PCP: Liya Pierre Date: 2022   Discharge Date: 2022    Length of Stay: 1  Code Status:  Prior  Admitting Physician: Ly Hamilton MD  Discharge Physician: Ly Hamilton MD     Active Discharge Diagnoses:     Hospital Problem Lists:  Principal Problem:    Alcohol intoxication delirium [facility])  Active Problems:    Hypoglycemia, ketotic    Alcohol abuse    Episode of recurrent major depressive disorder ([facility]    Hypokalemia  Resolved Problems:    * No resolved hospital problems. *      Admission Condition:  fair     Discharged Condition: good    Hospital Stay:     Hospital Course:  John Quintana is a 81 y.o. female who was admitted for the management of   Alcohol intoxication delirium ([facility] , presented to ER with Hypoglycemia and Alcohol Intoxication    81 F admitted for hypoglycemia and altered mental status in setting of intoxication.     She was mildly hypocalcemic, no MICHELLE, and Bun low were sent to [facility] [address] [facility], [facility]  [address], [address] [place] [address]      Phone: [phone]   gabapentin 300 MG capsule         No discharge procedures on file. Time Spent on discharge is  31 mins in patient examination, evaluation, counseling as well as medication reconciliation, prescriptions for required medications, discharge plan and follow up. Electronically signed by   Olga Lidia Marie MD  8/27/2022  1:42 PM      Thank you Dr. Marvin Escobedo for the opportunity to be involved in this patient's care.

## 2022-08-27 NOTE — PROGRESS NOTES
[facility]  Office: [phone]  Dipti Yeung, DO, Zohra Costa, DO, Malorie Madison, DO, Mindy Hennessy Blood, DO, Hari Avendano MD, Chiquis Kapadia MD, Juwan Coffman MD, Mona Thurston MD,  Oscar Marrufo MD, Amena Collier MD, Shahab Puente DO, Riaz Weathers MD,  Angel Morocho MD, Yovana Tracey MD, Tahir Reddy DO, Ken Recinos MD, Angela Bueno MD, Rigo Gentile MD, Candy Sawyer DO, Andrés Simental MD, Louis Garcia MD, Maile Berg, CNP,  Olesya Cervantes, CNP, Medhat Santos, CNP, Cortes Canas, CNP, Augustin Troy PA-C, Cleone Denver, DNP, Myra Carlos, CNP, Justin Bonilla, CNP, Suzette Klein, CNP, Jovani Bateman, CNP, Uma William, CNP, Sandi Aburto, CNS, Lucas Patterson, [facility], Yoly Pickering, CNP, Swati Reid, CNP, Dominique Rosado, CNP           [facility]    Progress Note    8/27/2022    7:41 AM    Name:   Kelly Conley  MRN:     [id number]     Acct:      [de-identified]   Room:   [address] [place] [address] Day:  1  Admit Date:  8/26/2022  5:28 AM    PCP:   Jong Lepe  Code Status:  Full Code    Subjective:     C/C:   Chief Complaint   Patient presents with    Hypoglycemia    Alcohol Intoxication     Interval History Status: improved.          Brief History:     81 F with extensive etoh abuse history presented from home at behest of family after not eating, drinking at home, became delirious    Hypoglycemic on arrival    Initially hypokalemic and had lactic acidosis from starvation ketosis, and etoh abuse    These have all normalized now    Review of Systems:     Constitutional:  negative for chills, fevers, sweats  Respiratory:  negative for cough, dyspnea on exertion, shortness of breath, wheezing  Cardiovascular:  negative for chest pain, chest pressure/discomfort, lower extremity edema, palpitations  Gastrointestinal:  negative for abdominal pain, constipation, diarrhea, nausea, vomiting  Neurological:  negative for dizziness, headache    Medications: Allergies: Allergies   Allergen Reactions    Codeine Rash       Current Meds:   Scheduled Meds:    chlordiazePOXIDE  10 mg Oral TID    potassium bicarb-citric acid  40 mEq Oral Daily    sodium chloride flush  5-40 mL IntraVENous 2 times per day    enoxaparin  40 mg SubCUTAneous Daily    sertraline  25 mg Oral Daily     Continuous Infusions:    dextrose 5% in lactated ringers      dextrose      sodium chloride       PRN Meds: glucose, dextrose bolus **OR** dextrose bolus, glucagon (rDNA), dextrose, sodium chloride flush, sodium chloride, potassium chloride **OR** potassium alternative oral replacement **OR** potassium chloride, magnesium sulfate, ondansetron **OR** ondansetron, polyethylene glycol, acetaminophen **OR** acetaminophen, LORazepam    Data:     Past Medical History:   has a past medical history of Alcohol abuse and Left supracondylar humerus fracture. Social History:   reports that she has quit smoking. Her smoking use included cigarettes. She has never used smokeless tobacco. She reports current alcohol use of about 6.0 standard drinks per week. She reports that she does not currently use drugs. Family History:   Family History   Problem Relation Age of Onset    Cirrhosis Father        Vitals:  /79   Pulse 72   Temp 98.5 °F (36.9 °C) (Oral)   Resp 16   Ht 5' 4\" (1.626 m)   Wt 107 lb (48.5 kg)   SpO2 100%   BMI 18.37 kg/m²   Temp (24hrs), Av.4 °F (36.9 °C), Min:98.3 °F (36.8 °C), Max:98.5 °F (36.9 °C)    Recent Labs     22  0533 22  1631 22  1728 22  2112   POCGLU 175* 123* 116* 152*       I/O (24Hr):     Intake/Output Summary (Last 24 hours) at 2022 0741  Last data filed at 2022 1345  Gross per 24 hour   Intake 200 ml   Output 1140 ml   Net -940 ml       Labs:  Hematology:  Recent Labs     22  0614 22  0440   WBC 14.1*  --    RBC 3.73*  --    HGB 11.3*  -- murmur  Abdomen:  soft, nontender, nondistended, normal bowel sounds, no masses, hepatomegaly, splenomegaly  Extremities:  no edema, redness, tenderness in the calves  Skin:  no gross lesions, rashes, induration    Assessment:        Hospital Problems             Last Modified POA    * (Principal) Alcohol intoxication delirium ([facility] 8/26/2022 Yes    Hypoglycemia, ketotic 8/26/2022 Yes    Alcohol abuse 8/26/2022 Yes    Episode of recurrent major depressive disorder ([facility] 8/26/2022 Yes    Hypokalemia 8/26/2022 Yes       Plan:        Etoh intoxiaction/delirium/withdrawal - start librium, ativan prn scheduled  Resume home antidepressants  Monitor electrolytes, but K looks normal   Mg good too  Protein jake malnutrition mild  HAGMA resolved  Minimal hyponatremia    Annamarie Ledesma MD  8/27/2022  7:41 AM

## 2022-08-27 NOTE — PLAN OF CARE
Problem: Discharge Planning  Goal: Discharge to home or other facility with appropriate resources  Outcome: Progressing  Flowsheets (Taken 8/26/2022 1738 by Rosa Spear RN)  Discharge to home or other facility with appropriate resources:   Identify barriers to discharge with patient and caregiver   Arrange for needed discharge resources and transportation as appropriate   Identify discharge learning needs (meds, wound care, etc)   Arrange for interpreters to assist at discharge as needed   Refer to discharge planning if patient needs post-hospital services based on physician order or complex needs related to functional status, cognitive ability or social support system     Problem: Safety - Adult  Goal: Free from fall injury  Outcome: Progressing     Problem: Pain  Goal: Verbalizes/displays adequate comfort level or baseline comfort level  Outcome: Progressing

## 2024-02-09 ENCOUNTER — APPOINTMENT (OUTPATIENT)
Dept: GENERAL RADIOLOGY | Age: 74
End: 2024-02-09
Payer: MEDICARE

## 2024-02-09 ENCOUNTER — HOSPITAL ENCOUNTER (OUTPATIENT)
Age: 74
Setting detail: OBSERVATION
Discharge: HOME | End: 2024-02-10
Attending: EMERGENCY MEDICINE | Admitting: EMERGENCY MEDICINE
Payer: MEDICARE

## 2024-02-09 ENCOUNTER — APPOINTMENT (OUTPATIENT)
Dept: CT IMAGING | Age: 74
End: 2024-02-09
Payer: MEDICARE

## 2024-02-09 DIAGNOSIS — N12 PYELONEPHRITIS: Primary | ICD-10-CM

## 2024-02-09 LAB
ALBUMIN SERPL-MCNC: 4 G/DL (ref 3.5–5.2)
ALBUMIN/GLOB SERPL: 1 {RATIO} (ref 1–2.5)
ALP SERPL-CCNC: 60 U/L (ref 35–104)
ALT SERPL-CCNC: 9 U/L (ref 5–33)
ANION GAP SERPL CALCULATED.3IONS-SCNC: 13 MMOL/L (ref 9–17)
AST SERPL-CCNC: 15 U/L
BASOPHILS # BLD: 0.03 K/UL (ref 0–0.2)
BASOPHILS NFR BLD: 0 % (ref 0–2)
BILIRUB SERPL-MCNC: 0.5 MG/DL (ref 0.3–1.2)
BILIRUB UR QL STRIP: NEGATIVE
BUN SERPL-MCNC: 6 MG/DL (ref 8–23)
C TRACH DNA SPEC QL PROBE+SIG AMP: NORMAL
CALCIUM SERPL-MCNC: 8.9 MG/DL (ref 8.6–10.4)
CANDIDA SPECIES: NEGATIVE
CHLORIDE SERPL-SCNC: 97 MMOL/L (ref 98–107)
CLARITY UR: ABNORMAL
CO2 SERPL-SCNC: 25 MMOL/L (ref 20–31)
COLOR UR: YELLOW
CREAT SERPL-MCNC: 0.6 MG/DL (ref 0.5–0.9)
EOSINOPHIL # BLD: 0.09 K/UL (ref 0–0.44)
EOSINOPHILS RELATIVE PERCENT: 1 % (ref 1–4)
EPI CELLS #/AREA URNS HPF: NORMAL /HPF (ref 0–5)
ERYTHROCYTE [DISTWIDTH] IN BLOOD BY AUTOMATED COUNT: 13.2 % (ref 11.8–14.4)
GARDNERELLA VAGINALIS: NEGATIVE
GFR SERPL CREATININE-BSD FRML MDRD: >60 ML/MIN/1.73M2
GLUCOSE SERPL-MCNC: 107 MG/DL (ref 70–99)
GLUCOSE UR STRIP-MCNC: NEGATIVE MG/DL
HCT VFR BLD AUTO: 41.4 % (ref 36.3–47.1)
HGB BLD-MCNC: 12.8 G/DL (ref 11.9–15.1)
HGB UR QL STRIP.AUTO: ABNORMAL
IMM GRANULOCYTES # BLD AUTO: 0.03 K/UL (ref 0–0.3)
IMM GRANULOCYTES NFR BLD: 0 %
KETONES UR STRIP-MCNC: NEGATIVE MG/DL
LEUKOCYTE ESTERASE UR QL STRIP: ABNORMAL
LIPASE SERPL-CCNC: 18 U/L (ref 13–60)
LYMPHOCYTES NFR BLD: 2.39 K/UL (ref 1.1–3.7)
LYMPHOCYTES RELATIVE PERCENT: 21 % (ref 24–43)
MCH RBC QN AUTO: 29.8 PG (ref 25.2–33.5)
MCHC RBC AUTO-ENTMCNC: 30.9 G/DL (ref 28.4–34.8)
MCV RBC AUTO: 96.3 FL (ref 82.6–102.9)
MONOCYTES NFR BLD: 1.07 K/UL (ref 0.1–1.2)
MONOCYTES NFR BLD: 9 % (ref 3–12)
N GONORRHOEA DNA SPEC QL PROBE+SIG AMP: NORMAL
NEUTROPHILS NFR BLD: 69 % (ref 36–65)
NEUTS SEG NFR BLD: 7.81 K/UL (ref 1.5–8.1)
NITRITE UR QL STRIP: NEGATIVE
NRBC BLD-RTO: 0 PER 100 WBC
PH UR STRIP: 5.5 [PH] (ref 5–8)
PLATELET # BLD AUTO: 324 K/UL (ref 138–453)
PMV BLD AUTO: 9.2 FL (ref 8.1–13.5)
POTASSIUM SERPL-SCNC: 4.1 MMOL/L (ref 3.7–5.3)
PROT SERPL-MCNC: 7.9 G/DL (ref 6.4–8.3)
PROT UR STRIP-MCNC: ABNORMAL MG/DL
RBC # BLD AUTO: 4.3 M/UL (ref 3.95–5.11)
RBC #/AREA URNS HPF: NORMAL /HPF (ref 0–2)
SODIUM SERPL-SCNC: 135 MMOL/L (ref 135–144)
SOURCE: NORMAL
SP GR UR STRIP: 1.01 (ref 1–1.03)
SPECIMEN DESCRIPTION: NORMAL
TRICHOMONAS: NEGATIVE
TROPONIN I SERPL HS-MCNC: <6 NG/L (ref 0–14)
UROBILINOGEN UR STRIP-ACNC: NORMAL EU/DL (ref 0–1)
WBC #/AREA URNS HPF: NORMAL /HPF (ref 0–5)
WBC OTHER # BLD: 11.4 K/UL (ref 3.5–11.3)

## 2024-02-09 PROCEDURE — 87510 GARDNER VAG DNA DIR PROBE: CPT

## 2024-02-09 PROCEDURE — 2580000003 HC RX 258

## 2024-02-09 PROCEDURE — 87660 TRICHOMONAS VAGIN DIR PROBE: CPT

## 2024-02-09 PROCEDURE — 99285 EMERGENCY DEPT VISIT HI MDM: CPT

## 2024-02-09 PROCEDURE — 6360000004 HC RX CONTRAST MEDICATION

## 2024-02-09 PROCEDURE — 96374 THER/PROPH/DIAG INJ IV PUSH: CPT

## 2024-02-09 PROCEDURE — 96375 TX/PRO/DX INJ NEW DRUG ADDON: CPT

## 2024-02-09 PROCEDURE — 71046 X-RAY EXAM CHEST 2 VIEWS: CPT

## 2024-02-09 PROCEDURE — 74177 CT ABD & PELVIS W/CONTRAST: CPT

## 2024-02-09 PROCEDURE — 87591 N.GONORRHOEAE DNA AMP PROB: CPT

## 2024-02-09 PROCEDURE — 6370000000 HC RX 637 (ALT 250 FOR IP)

## 2024-02-09 PROCEDURE — 85025 COMPLETE CBC W/AUTO DIFF WBC: CPT

## 2024-02-09 PROCEDURE — G0378 HOSPITAL OBSERVATION PER HR: HCPCS

## 2024-02-09 PROCEDURE — 87491 CHLMYD TRACH DNA AMP PROBE: CPT

## 2024-02-09 PROCEDURE — 80053 COMPREHEN METABOLIC PANEL: CPT

## 2024-02-09 PROCEDURE — 81001 URINALYSIS AUTO W/SCOPE: CPT

## 2024-02-09 PROCEDURE — 84484 ASSAY OF TROPONIN QUANT: CPT

## 2024-02-09 PROCEDURE — 93005 ELECTROCARDIOGRAM TRACING: CPT | Performed by: EMERGENCY MEDICINE

## 2024-02-09 PROCEDURE — 96365 THER/PROPH/DIAG IV INF INIT: CPT

## 2024-02-09 PROCEDURE — 83690 ASSAY OF LIPASE: CPT

## 2024-02-09 PROCEDURE — 6360000002 HC RX W HCPCS

## 2024-02-09 PROCEDURE — 87086 URINE CULTURE/COLONY COUNT: CPT

## 2024-02-09 PROCEDURE — 87480 CANDIDA DNA DIR PROBE: CPT

## 2024-02-09 RX ORDER — ACETAMINOPHEN 325 MG/1
650 TABLET ORAL EVERY 4 HOURS PRN
Status: DISCONTINUED | OUTPATIENT
Start: 2024-02-09 | End: 2024-02-10 | Stop reason: HOSPADM

## 2024-02-09 RX ORDER — SODIUM CHLORIDE 0.9 % (FLUSH) 0.9 %
5-40 SYRINGE (ML) INJECTION PRN
Status: DISCONTINUED | OUTPATIENT
Start: 2024-02-09 | End: 2024-02-10 | Stop reason: HOSPADM

## 2024-02-09 RX ORDER — SODIUM CHLORIDE 0.9 % (FLUSH) 0.9 %
5-40 SYRINGE (ML) INJECTION EVERY 12 HOURS SCHEDULED
Status: DISCONTINUED | OUTPATIENT
Start: 2024-02-09 | End: 2024-02-10 | Stop reason: HOSPADM

## 2024-02-09 RX ORDER — KETOROLAC TROMETHAMINE 15 MG/ML
15 INJECTION, SOLUTION INTRAMUSCULAR; INTRAVENOUS ONCE
Status: COMPLETED | OUTPATIENT
Start: 2024-02-09 | End: 2024-02-09

## 2024-02-09 RX ORDER — ACETAMINOPHEN 325 MG/1
650 TABLET ORAL ONCE
Status: COMPLETED | OUTPATIENT
Start: 2024-02-09 | End: 2024-02-09

## 2024-02-09 RX ORDER — ONDANSETRON 4 MG/1
4 TABLET, ORALLY DISINTEGRATING ORAL EVERY 8 HOURS PRN
Status: DISCONTINUED | OUTPATIENT
Start: 2024-02-09 | End: 2024-02-10 | Stop reason: HOSPADM

## 2024-02-09 RX ORDER — SODIUM CHLORIDE 9 MG/ML
INJECTION, SOLUTION INTRAVENOUS PRN
Status: DISCONTINUED | OUTPATIENT
Start: 2024-02-09 | End: 2024-02-10 | Stop reason: HOSPADM

## 2024-02-09 RX ORDER — ONDANSETRON 2 MG/ML
4 INJECTION INTRAMUSCULAR; INTRAVENOUS EVERY 6 HOURS PRN
Status: DISCONTINUED | OUTPATIENT
Start: 2024-02-09 | End: 2024-02-10 | Stop reason: HOSPADM

## 2024-02-09 RX ORDER — ENOXAPARIN SODIUM 100 MG/ML
40 INJECTION SUBCUTANEOUS DAILY
Status: DISCONTINUED | OUTPATIENT
Start: 2024-02-09 | End: 2024-02-10 | Stop reason: HOSPADM

## 2024-02-09 RX ADMIN — KETOROLAC TROMETHAMINE 15 MG: 15 INJECTION, SOLUTION INTRAMUSCULAR; INTRAVENOUS at 08:13

## 2024-02-09 RX ADMIN — ACETAMINOPHEN 325MG 650 MG: 325 TABLET ORAL at 08:04

## 2024-02-09 RX ADMIN — IOPAMIDOL 75 ML: 755 INJECTION, SOLUTION INTRAVENOUS at 09:01

## 2024-02-09 RX ADMIN — SODIUM CHLORIDE, PRESERVATIVE FREE 10 ML: 5 INJECTION INTRAVENOUS at 21:03

## 2024-02-09 RX ADMIN — CEFTRIAXONE SODIUM 1000 MG: 1 INJECTION, POWDER, FOR SOLUTION INTRAMUSCULAR; INTRAVENOUS at 11:06

## 2024-02-09 ASSESSMENT — PAIN - FUNCTIONAL ASSESSMENT: PAIN_FUNCTIONAL_ASSESSMENT: NONE - DENIES PAIN

## 2024-02-09 ASSESSMENT — PAIN SCALES - GENERAL: PAINLEVEL_OUTOF10: 0

## 2024-02-09 NOTE — ED NOTES
The following labs were labeled with appropriate pt sticker and tubed to lab:     [] Blue     [] Lavender   [] on ice  [] Green/yellow  [] Green/black [] on ice  [] Grey  [] on ice  [] Yellow  [] Red  [] Pink  [] Type/ Screen  [] ABG  [] VBG    [] COVID-19 swab    [] Rapid  [] PCR  [] Flu swab  [] Peds Viral Panel     [] Urine Sample  [] Fecal Sample  [x] Pelvic Cultures  [] Blood Cultures  [] X 2  [] STREP Cultures  [] Wound Cultures

## 2024-02-09 NOTE — ED PROVIDER NOTES
Note Started: 8:42 AM EST         [facility]     Emergency Department     Faculty Attestation    I performed a history and physical examination of the patient and discussed management with the resident. I reviewed the resident’s note and agree with the documented findings and plan of care. Any areas of disagreement are noted on the chart. I was personally present for the key portions of any procedures. I have documented in the chart those procedures where I was not present during the key portions. I have reviewed the emergency nurses triage note. I agree with the chief complaint, past medical history, past surgical history, allergies, medications, social and family history as documented unless otherwise noted below.        For Physician Assistant/ Nurse Practitioner cases/documentation I have personally evaluated this patient and have completed at least one if not all key elements of the E/M (history, physical exam, and MDM). Additional findings are as noted.  I have personally seen and evaluated the patient.  I find the patient's history and physical exam are consistent with the NP/PA documentation.  I agree with the care provided, treatment rendered, disposition and follow-up plan.    73-year-old female presenting with dysuria, urgency, and frequency.  Started having left lower quadrant pain.  No back pain.  No history of UTIs.  Denies vaginal discharge, but has had a new sexual partner in the last 2 weeks.  No change in bowel habits.  Eating and drinking normally.  No blood in the urine.    Exam:  General : Laying on the bed, awake, alert, and in no acute distress  CV : normal rate and regular rhythm  Lungs : Breathing comfortably on room air with no tachypnea, hypoxia, or increased work of breathing  Abdomen: Tender in the suprapubic and left lower quadrant regions of the abdomen.  No rebound tenderness.  No right lower quadrant tenderness.  No CVA tenderness to percussion.    DDx:

## 2024-02-09 NOTE — CARE COORDINATION
DISCHARGE PLANNING EVALUATION: OP/OBSERVATION        2/9/24, 4:50 PM OTIS Fontanez         Location: OBS 21/21-1   Reason for hospitalization: Pyelonephritis [N12]     CM Services requested for transitional needs.     PCP: Mary Arriaza PA    Transportation/Food Security/Housekeeping Addressed:  No issues identified.     Equipment needs: none identified    Transition plan: Obs: no needs identified

## 2024-02-09 NOTE — H&P
[facility]  CDU / OBSERVATION ENCOUNTER  Resident NOTE     Pt Name: Camille Fontanez  MRN: [id number]  Birthdate 1950  Date of evaluation: 2/9/24  Patient's PCP is :  Mary Arriaza PA    CHIEF COMPLAINT       Chief Complaint   Patient presents with    Urinary Frequency    Flank Pain         HISTORY OF PRESENT ILLNESS    Camille Fontanez is a 73 y.o. female who presents with 4 days of urinary urgency, urinary discomfort, and left lower quadrant pain intermittently.  Patient states that she feels she cannot empty her bladder all the way, and also describes some burning discomfort when urinating.  Patient also with subjective fevers and some chills but otherwise no other associated symptoms.  In the ED, patient found to have UTI.  Patient also underwent CT abdomen and pelvis which showed pyelonephritis with perinephric fat stranding on the left kidney.  Patient was started on IV antibiotics and was placed in the observation unit for ongoing IV antibiotic therapy and pain control.  Patient stating that she is tolerating p.o. intake, voiding spontaneously, ambulating without difficulty.  Patient very well-appearing.  Will be reevaluated this afternoon to determine if patient necessitates overnight stay in the observation unit or if patient can be redosed with Rocephin and sent home with oral antibiotics.    Location/Symptom: urinary discomfort   Timing/Onset: onset 4 days ago   Provocation: worse with urination   Quality: bruning   Radiation: none   Severity: moderate   Timing/Duration: intermittent   Modifying Factors: none     History was obtained in part through review of the ED chart. When possible, a direct discussion was had with ED nurses, residents, and attendings  REVIEW OF SYSTEMS       General ROS - No fevers, No malaise   Ophthalmic ROS - No discharge, No changes in vision  ENT ROS -  No sore throat, No rhinorrhea,   Respiratory ROS - no shortness of breath, no cough, no   the dictation for accuracy, there may be errors in the transcription that are not intended.

## 2024-02-09 NOTE — ED NOTES
Pt to ed with family from home.   Pt states symptoms started last week, last Saturday. Pt states after having intercourse with a man. Pt reports right flank pain, urinary frequency and burning with urination. Pt also reports chills.   Pt denies chest pain, sob, abdominal pain.   Pt is alert, oriented, speaking in full, complete sentences.   Pt rates pain 10/10

## 2024-02-09 NOTE — ED NOTES
ED to inpatient nurses report    Chief Complaint   Patient presents with    Urinary Frequency    Flank Pain      Present to ED from home  LOC: alert and orientated to name, place, date  Vital signs   Vitals:    02/09/24 0745 02/09/24 0845   BP: (!) 141/67    Pulse: 79 79   Resp: 18    Temp: 100.2 °F (37.9 °C)    TempSrc: Oral    SpO2: 99%       Oxygen Baseline room air    Current needs required room air   LDAs:   Peripheral IV 02/09/24 Left Antecubital (Active)     Mobility: Independent  Pending ED orders: IV ANT  Present condition: stable, alert, oriented.   Code Status: [unfilled]   Consults:  []  Hospitalist  Completed  [] yes [] no  []  Medicine  Completed  [] yes [] No  []  Cardiology  Completed  [] yes [] No  []  GI   Completed  [] yes [] No  []  Neurology  Completed  [] yes [] No  []  Nephrology Completed  [] yes [] No  []  Vascular  Completed  [] yes [] No   []  Surgery  Completed  [] yes [] No   []  Urology  Completed  [] yes [] No   []  Plastics  Completed  [] yes [] No   []  ENT  Completed  [] yes [] No   []  Other   Completed  [] yes [] No  Pertinent event(s)   Pertinent event(s)   Electronically signed by Renetta Muprhy RN on 2/9/2024 at 10:53 AM

## 2024-02-09 NOTE — ED PROVIDER NOTES
[facility] OBSERVATION UNIT  Emergency Department Encounter  Emergency Medicine Resident     Pt Name:Camille Fontanez  MRN: [id number]  Birthdate 1950  Date of evaluation: 2/9/24  PCP:  Mary Arriaza PA  Note Started: 7:33 AM EST      CHIEF COMPLAINT       Chief Complaint   Patient presents with    Urinary Frequency    Flank Pain       HISTORY OF PRESENT ILLNESS  (Location/Symptom, Timing/Onset, Context/Setting, Quality, Duration, Modifying Factors, Severity.)      Camille Fontanez is a 73 y.o. female who presents with 4 days of urinary urgency and discomfort with 2 days of left lower quadrant abdominal pain.  Patient says when she has been peeing it feels like she cannot empty all the way and has had some burning discomfort.  Denies any blood.  Now having some left lower quadrant abdominal pain, 7/10 that remains constantly present but is worse after she urinates. she has felt subjectively feverish with some mild chills but otherwise has been well.  She denies any shortness of breath or chest pain.  Denies any nausea or vomiting.  Denies numbness, tingling, weakness.  Last bowel movement was yesterday evening and was normal for her.  She has been drinking lots of fluids due to her urinary symptoms but has not had much of an appetite over the past few days    PAST MEDICAL / SURGICAL / SOCIAL / FAMILY HISTORY      has a past medical history of Alcohol abuse and Left supracondylar humerus fracture.       has a past surgical history that includes Humerus fracture surgery (Left).      Social History     Socioeconomic History    Marital status:      Spouse name: Not on file    Number of children: Not on file    Years of education: Not on file    Highest education level: Not on file   Occupational History    Not on file   Tobacco Use    Smoking status: Former     Types: Cigarettes    Smokeless tobacco: Never   Substance and Sexual Activity    Alcohol use: Yes     Alcohol/week: 6.0 standard drinks of

## 2024-02-10 VITALS
RESPIRATION RATE: 18 BRPM | TEMPERATURE: 97.2 F | HEART RATE: 66 BPM | OXYGEN SATURATION: 96 % | DIASTOLIC BLOOD PRESSURE: 67 MMHG | SYSTOLIC BLOOD PRESSURE: 123 MMHG

## 2024-02-10 LAB
ANION GAP SERPL CALCULATED.3IONS-SCNC: 10 MMOL/L (ref 9–17)
BASOPHILS # BLD: 0.03 K/UL (ref 0–0.2)
BASOPHILS NFR BLD: 0 % (ref 0–2)
BUN SERPL-MCNC: 6 MG/DL (ref 8–23)
CALCIUM SERPL-MCNC: 8.6 MG/DL (ref 8.6–10.4)
CHLORIDE SERPL-SCNC: 105 MMOL/L (ref 98–107)
CO2 SERPL-SCNC: 26 MMOL/L (ref 20–31)
CREAT SERPL-MCNC: 0.6 MG/DL (ref 0.5–0.9)
EKG ATRIAL RATE: 81 BPM
EKG ATRIAL RATE: 81 BPM
EKG P AXIS: 65 DEGREES
EKG P AXIS: 65 DEGREES
EKG P-R INTERVAL: 150 MS
EKG P-R INTERVAL: 150 MS
EKG Q-T INTERVAL: 370 MS
EKG Q-T INTERVAL: 370 MS
EKG QRS DURATION: 68 MS
EKG QRS DURATION: 68 MS
EKG QTC CALCULATION (BAZETT): 429 MS
EKG QTC CALCULATION (BAZETT): 429 MS
EKG R AXIS: 65 DEGREES
EKG R AXIS: 65 DEGREES
EKG T AXIS: 73 DEGREES
EKG T AXIS: 73 DEGREES
EKG VENTRICULAR RATE: 81 BPM
EKG VENTRICULAR RATE: 81 BPM
EOSINOPHIL # BLD: 0.17 K/UL (ref 0–0.44)
EOSINOPHILS RELATIVE PERCENT: 2 % (ref 1–4)
ERYTHROCYTE [DISTWIDTH] IN BLOOD BY AUTOMATED COUNT: 12.9 % (ref 11.8–14.4)
GFR SERPL CREATININE-BSD FRML MDRD: >60 ML/MIN/1.73M2
GLUCOSE SERPL-MCNC: 104 MG/DL (ref 70–99)
HCT VFR BLD AUTO: 37.5 % (ref 36.3–47.1)
HGB BLD-MCNC: 11.8 G/DL (ref 11.9–15.1)
IMM GRANULOCYTES # BLD AUTO: 0.03 K/UL (ref 0–0.3)
IMM GRANULOCYTES NFR BLD: 0 %
LYMPHOCYTES NFR BLD: 2.6 K/UL (ref 1.1–3.7)
LYMPHOCYTES RELATIVE PERCENT: 35 % (ref 24–43)
MCH RBC QN AUTO: 30 PG (ref 25.2–33.5)
MCHC RBC AUTO-ENTMCNC: 31.5 G/DL (ref 28.4–34.8)
MCV RBC AUTO: 95.4 FL (ref 82.6–102.9)
MICROORGANISM SPEC CULT: NORMAL
MONOCYTES NFR BLD: 0.68 K/UL (ref 0.1–1.2)
MONOCYTES NFR BLD: 9 % (ref 3–12)
NEUTROPHILS NFR BLD: 53 % (ref 36–65)
NEUTS SEG NFR BLD: 4.01 K/UL (ref 1.5–8.1)
NRBC BLD-RTO: 0 PER 100 WBC
PLATELET # BLD AUTO: 316 K/UL (ref 138–453)
PMV BLD AUTO: 10.1 FL (ref 8.1–13.5)
POTASSIUM SERPL-SCNC: 3.7 MMOL/L (ref 3.7–5.3)
RBC # BLD AUTO: 3.93 M/UL (ref 3.95–5.11)
SODIUM SERPL-SCNC: 141 MMOL/L (ref 135–144)
SPECIMEN DESCRIPTION: NORMAL
WBC OTHER # BLD: 7.5 K/UL (ref 3.5–11.3)

## 2024-02-10 PROCEDURE — 85025 COMPLETE CBC W/AUTO DIFF WBC: CPT

## 2024-02-10 PROCEDURE — 6370000000 HC RX 637 (ALT 250 FOR IP)

## 2024-02-10 PROCEDURE — 2580000003 HC RX 258

## 2024-02-10 PROCEDURE — 80048 BASIC METABOLIC PNL TOTAL CA: CPT

## 2024-02-10 PROCEDURE — 36415 COLL VENOUS BLD VENIPUNCTURE: CPT

## 2024-02-10 PROCEDURE — G0378 HOSPITAL OBSERVATION PER HR: HCPCS

## 2024-02-10 RX ORDER — HYDROCODONE BITARTRATE AND ACETAMINOPHEN 5; 325 MG/1; MG/1
1 TABLET ORAL EVERY 4 HOURS PRN
Qty: 6 TABLET | Refills: 0 | Status: SHIPPED | OUTPATIENT
Start: 2024-02-10 | End: 2024-02-13

## 2024-02-10 RX ORDER — IBUPROFEN 600 MG/1
600 TABLET ORAL 3 TIMES DAILY PRN
Qty: 15 TABLET | Refills: 0 | Status: SHIPPED | OUTPATIENT
Start: 2024-02-10 | End: 2024-02-10

## 2024-02-10 RX ORDER — CEPHALEXIN 500 MG/1
500 CAPSULE ORAL 2 TIMES DAILY
Qty: 14 CAPSULE | Refills: 0 | Status: SHIPPED | OUTPATIENT
Start: 2024-02-10 | End: 2024-02-17

## 2024-02-10 RX ORDER — HYDROCODONE BITARTRATE AND ACETAMINOPHEN 5; 325 MG/1; MG/1
1 TABLET ORAL EVERY 4 HOURS PRN
Qty: 6 TABLET | Refills: 0 | Status: SHIPPED | OUTPATIENT
Start: 2024-02-10 | End: 2024-02-10

## 2024-02-10 RX ORDER — CEPHALEXIN 500 MG/1
500 CAPSULE ORAL 2 TIMES DAILY
Qty: 14 CAPSULE | Refills: 0 | Status: SHIPPED | OUTPATIENT
Start: 2024-02-10 | End: 2024-02-10

## 2024-02-10 RX ORDER — IBUPROFEN 600 MG/1
600 TABLET ORAL 3 TIMES DAILY PRN
Qty: 15 TABLET | Refills: 0 | Status: SHIPPED | OUTPATIENT
Start: 2024-02-10 | End: 2024-02-15

## 2024-02-10 RX ADMIN — ACETAMINOPHEN 650 MG: 325 TABLET ORAL at 02:44

## 2024-02-10 RX ADMIN — SODIUM CHLORIDE, PRESERVATIVE FREE 10 ML: 5 INJECTION INTRAVENOUS at 09:13

## 2024-02-10 ASSESSMENT — PAIN - FUNCTIONAL ASSESSMENT: PAIN_FUNCTIONAL_ASSESSMENT: ACTIVITIES ARE NOT PREVENTED

## 2024-02-10 ASSESSMENT — PAIN DESCRIPTION - DESCRIPTORS: DESCRIPTORS: SHARP;PRESSURE

## 2024-02-10 ASSESSMENT — PAIN SCALES - GENERAL: PAINLEVEL_OUTOF10: 6

## 2024-02-10 ASSESSMENT — PAIN DESCRIPTION - ORIENTATION: ORIENTATION: LEFT

## 2024-02-10 ASSESSMENT — PAIN DESCRIPTION - LOCATION: LOCATION: FLANK

## 2024-02-10 NOTE — DISCHARGE SUMMARY
CDU Discharge Summary        Patient:  Camille Fontanez  YOB: 1950    MRN: [id number]   Acct: [id number]    Primary Care Physician: Mary Arriaza PA    Admit date:  2/9/2024  7:28 AM  Discharge date: 2/10/2024 12:53 PM     Discharge Diagnoses:     Acute pyelonephritis due to UTI  Improved with IV antibiotics, fluids    Follow-up:  Call today/tomorrow for a follow up appointment with Mary Arriaza PA , or return to the Emergency Room with worsening symptoms    Stressed to patient the importance of following up with primary care doctor for further workup/management of symptoms.  Pt verbalizes understanding and agrees with plan.    Discharge Medications:  Changes to medications as below          Medication List        START taking these medications      cephALEXin 500 MG capsule  Commonly known as: KEFLEX  Take 1 capsule by mouth 2 times daily for 7 days     HYDROcodone-acetaminophen 5-325 MG per tablet  Commonly known as: Norco  Take 1 tablet by mouth every 4 hours as needed for Pain for up to 3 days. Intended supply: 3 days. Take lowest dose possible to manage pain Max Daily Amount: 6 tablets     ibuprofen 600 MG tablet  Commonly known as: ADVIL;MOTRIN  Take 1 tablet by mouth 3 times daily as needed for Pain            ASK your doctor about these medications      acetaminophen 650 MG extended release tablet  Commonly known as: TYLENOL     albuterol sulfate  (90 Base) MCG/ACT inhaler  Commonly known as: PROVENTIL;VENTOLIN;PROAIR               Where to Get Your Medications        These medications were sent to [place] [address] -  [phone] - F [phone]  [address] [place] [address]      Phone: [phone]   cephALEXin 500 MG capsule  HYDROcodone-acetaminophen 5-325 MG per tablet  ibuprofen 600 MG tablet         Diet:  No diet orders on file , Advance as tolerated     Activity:  As tolerated    Consultants: None    Procedures:   Not indicated     Diagnostic Test:   Results for orders placed or performed during the hospital encounter of 02/09/24   C.trachomatis N.gonorrhoeae DNA    Specimen: Genital   Result Value Ref Range    Specimen Description .GENITAL - NOT SPECIFIED     C. trachomatis DNA PENDING     N. gonorrhoeae DNA PENDING    Vaginitis DNA Probe    Specimen: Vaginal   Result Value Ref Range    Source .VAGINAL SWAB     Trichomonas NEGATIVE NEGATIVE    GARDNERELLA VAGINALIS NEGATIVE NEGATIVE    Candida species NEGATIVE NEGATIVE   Culture, Urine    Specimen: Urine, clean catch   Result Value Ref Range    Specimen Description .CLEAN CATCH URINE     Culture NO SIGNIFICANT GROWTH    Urinalysis with Reflex to Culture    Specimen: Urine   Result Value Ref Range    Color, UA Yellow Yellow    Turbidity UA Turbid (A) Clear    Glucose, Ur NEGATIVE NEGATIVE mg/dL    Bilirubin Urine NEGATIVE NEGATIVE    Ketones, Urine NEGATIVE NEGATIVE mg/dL    Specific Gravity, UA 1.007 1.005 - 1.030    Urine Hgb LARGE (A) NEGATIVE    pH, UA 5.5 5.0 - 8.0    Protein, UA 2+ (A) NEGATIVE mg/dL    Urobilinogen, Urine Normal 0.0 - 1.0 EU/dL    Nitrite, Urine NEGATIVE NEGATIVE    Leukocyte Esterase, Urine LARGE (A) NEGATIVE   CBC with Auto Differential   Result Value Ref Range    WBC 11.4 (H) 3.5 - 11.3 k/uL    RBC 4.30 3.95 - 5.11 m/uL    Hemoglobin 12.8 11.9 - 15.1 g/dL    Hematocrit 41.4 36.3 - 47.1 %    MCV 96.3 82.6 - 102.9 fL    MCH 29.8 25.2 - 33.5 pg    MCHC 30.9 28.4 - 34.8 g/dL    RDW 13.2 11.8 - 14.4 %    Platelets 324 138 - 453 k/uL    MPV 9.2 8.1 - 13.5 fL    NRBC Automated 0.0 0.0 per 100 WBC    Neutrophils % 69 (H) 36 - 65 %    Lymphocytes % 21 (L) 24 - 43 %    Monocytes % 9 3 - 12 %    Eosinophils % 1 1 - 4 %    Basophils % 0 0 - 2 %    Immature Granulocytes 0 0 %    Neutrophils Absolute 7.81 1.50 - 8.10 k/uL    Lymphocytes Absolute 2.39 1.10 - 3.70 k/uL    Monocytes Absolute 1.07 0.10 - 1.20 k/uL    Eosinophils Absolute 0.09 0.00 - 0.44 k/uL

## 2024-02-10 NOTE — DISCHARGE INSTRUCTIONS
You were seen in the ER and admitted to the hospital for pyelonephritis, an infection of your kidney, as well as a urinary tract infection.  You were given IV antibiotics while in the hospital.  You will be provided oral antibiotics to go home, please take the entire course of antibiotics even if you begin to feel better.  Stay hydrated. Avoid drinking alcohol or using this medication.  Follow-up with your primary care doctor within the week for reevaluation.  Return to the ER if fevers, chills, worsening pain, chest pain, shortness of breath, lightheadedness, passing out, or any other concerns.

## 2024-02-10 NOTE — PROGRESS NOTES
[facility]  CDU / OBSERVATION ENCOUNTER  ATTENDING NOTE       I performed a history and physical examination of the patient and discussed management with the resident or midlevel provider. I reviewed the resident or midlevel provider's note and agree with the documented findings and plan of care. Any areas of disagreement are noted on the chart. I was personally present for the key portions of any procedures. I have documented in the chart those procedures where I was not present during the key portions. I have reviewed the nurses notes. I agree with the chief complaint, past medical history, past surgical history, allergies, medications, social and family history as documented unless otherwise noted below.    The Family history, social history, and ROS are effectively unchanged since admission unless noted elsewhere in the chart.    This patient was placed in the observation unit for reevaluation for possible admission to the hospital    Patient admitted yesterday with pyelonephritis.  Patient had symptoms requiring ongoing pain control and symptom relief in addition to an IV antibiotics.  Patient for reassessment today after 24 hours of IV medications.  Patient with anticipated improvement and discharge.    Patient feeling markedly better.  Patient for further treatment as outpatient.    Kristopher Jolly MD  Attending Emergency  Physician

## 2024-02-10 NOTE — PROGRESS NOTES
Discharge teaching and instructions completed with patient using teachback method. AVS reviewed. Prescriptions called into pharmacy for patient. Patient voiced understanding regarding prescriptions, follow up appointments, and care of self at home. Discharged home with family. All questions answered.

## 2024-02-10 NOTE — PROGRESS NOTES
OBS/CDU   RESIDENT NOTE      Patients PCP is:  Mary Arriaza PA      SUBJECTIVE      No acute events overnight.  Reports feeling much better than she did at time of admission, pain improved, urinating less frequently.  Tolerating oral intake.  Ambulating without difficulty.  No complaints at this time, feels ready to go home with oral antibiotics    PHYSICAL EXAM      General: NAD, AO X 3  Heent: EOMI, PERRL  Neck: SUPPLE, NO JVD  Cardiovascular: RRR, S1S2  Pulmonary: CTAB, NO SOB  Abdomen: Soft, minimal diffuse TTP, ND, +BS  Extremities: +2/4 PULSES DISTAL, NO SWELLING  Neuro / Psych: NO NUMBNESS OR TINGLING, MENTATION AT BASELINE    PERTINENT TEST /EXAMS      I have reviewed all available laboratory results.    MEDICATIONS CURRENT   sodium chloride flush 0.9 % injection 5-40 mL, 2 times per day  sodium chloride flush 0.9 % injection 5-40 mL, PRN  0.9 % sodium chloride infusion, PRN  enoxaparin (LOVENOX) injection 40 mg, Daily  acetaminophen (TYLENOL) tablet 650 mg, Q4H PRN  ondansetron (ZOFRAN-ODT) disintegrating tablet 4 mg, Q8H PRN   Or  ondansetron (ZOFRAN) injection 4 mg, Q6H PRN  cefTRIAXone (ROCEPHIN) 1000 mg in sterile water 10 mL IV syringe, Q24H        All medication charted and reviewed.    CONSULTS      None    ASSESSMENT/PLAN       Camille Fontanez is a 73 y.o. female who presents with dysuria and left flank pain x 4 days    Pyelonephritis  CT abdomen with perinephric fat stranding, consistent with pyelonephritis  UA concerning for UTI  Normal renal function on BMP  Well-hydrated, tolerating p.o.  IV Rocephin x 2  Patient has good support system at home  Clinically improved, plan to discharge with oral antibiotics, return precautions, close follow-up    Continue home medications and pain control  Monitor vitals, labs, and imaging  DISPO: pending  clinical improvement    --  Gardenia Mckeon DO  Emergency Medicine Resident Physician     This dictation was generated by voice recognition computer  software.  Although all attempts are made to edit the dictation for accuracy, there may be errors in the transcription that are not intended.

## 2024-02-10 NOTE — PLAN OF CARE
Problem: Discharge Planning  Goal: Discharge to home or other facility with appropriate resources  2/10/2024 0303 by Bk Robledo, RN  Outcome: Progressing  2/9/2024 1616 by Morris Sandoval, RN  Outcome: Progressing     Problem: Safety - Adult  Goal: Free from fall injury  2/10/2024 0303 by Bk Robledo, RN  Outcome: Progressing  2/9/2024 1616 by Morris Sandoval, RN  Outcome: Progressing     Problem: Pain  Goal: Verbalizes/displays adequate comfort level or baseline comfort level  Outcome: Progressing

## 2024-02-12 LAB
C TRACH DNA SPEC QL PROBE+SIG AMP: NEGATIVE
EKG ATRIAL RATE: 81 BPM
EKG P AXIS: 65 DEGREES
EKG P-R INTERVAL: 150 MS
EKG Q-T INTERVAL: 370 MS
EKG QRS DURATION: 68 MS
EKG QTC CALCULATION (BAZETT): 429 MS
EKG R AXIS: 65 DEGREES
EKG T AXIS: 73 DEGREES
EKG VENTRICULAR RATE: 81 BPM
N GONORRHOEA DNA SPEC QL PROBE+SIG AMP: NEGATIVE
SPECIMEN DESCRIPTION: NORMAL

## 2024-02-12 PROCEDURE — 93010 ELECTROCARDIOGRAM REPORT: CPT | Performed by: INTERNAL MEDICINE

## 2024-11-25 ENCOUNTER — PREP FOR PROCEDURE (OUTPATIENT)
Dept: GASTROENTEROLOGY | Age: 74
End: 2024-11-25

## 2024-11-25 ENCOUNTER — TELEPHONE (OUTPATIENT)
Dept: GASTROENTEROLOGY | Age: 74
End: 2024-11-25

## 2024-11-25 DIAGNOSIS — Z12.11 ENCOUNTER FOR SCREENING COLONOSCOPY: ICD-10-CM

## 2024-11-25 RX ORDER — SODIUM, POTASSIUM,MAG SULFATES 17.5-3.13G
1 SOLUTION, RECONSTITUTED, ORAL ORAL ONCE
Qty: 1 EACH | Refills: 0 | Status: SHIPPED | OUTPATIENT
Start: 2024-11-25 | End: 2024-11-25

## 2024-11-25 NOTE — TELEPHONE ENCOUNTER
Procedure scheduled/Hamdani  Procedure: colonoscopy  Dx: colon screen  Ordering:    Referred By:   RADHA ORDÑOEZ   Date: 01-21-25  Time:11:00 am/ arrival 9:30 am  Hospital: [facility]  Bowel prep instructions sent to patient: Suprep  Patient advised by phone/mail via phone

## 2024-12-25 PROBLEM — Z12.11 ENCOUNTER FOR SCREENING COLONOSCOPY: Status: RESOLVED | Noted: 2024-11-25 | Resolved: 2024-12-25

## 2025-01-13 PROBLEM — Z12.11 ENCOUNTER FOR SCREENING COLONOSCOPY: Status: ACTIVE | Noted: 2024-11-25

## 2025-02-12 PROBLEM — Z12.11 ENCOUNTER FOR SCREENING COLONOSCOPY: Status: RESOLVED | Noted: 2024-11-25 | Resolved: 2025-02-12

## 2025-04-10 ENCOUNTER — PREP FOR PROCEDURE (OUTPATIENT)
Dept: GASTROENTEROLOGY | Age: 75
End: 2025-04-10

## 2025-04-10 DIAGNOSIS — Z12.11 SCREENING FOR COLON CANCER: ICD-10-CM

## 2025-04-28 RX ORDER — MULTIVIT-MIN/FA/LYCOPEN/LUTEIN .4-300-25
1 TABLET ORAL DAILY
COMMUNITY

## 2025-04-28 RX ORDER — VITAMIN K2 90 MCG
820 CAPSULE ORAL DAILY
COMMUNITY

## 2025-04-28 RX ORDER — PHENOL 1.4 %
1 AEROSOL, SPRAY (ML) MUCOUS MEMBRANE DAILY
COMMUNITY

## 2025-04-28 RX ORDER — MEGESTROL ACETATE 20 MG/1
20 TABLET ORAL 4 TIMES DAILY
COMMUNITY

## 2025-04-28 RX ORDER — ALBUTEROL SULFATE 0.63 MG/3ML
1 SOLUTION RESPIRATORY (INHALATION) EVERY 6 HOURS PRN
COMMUNITY

## 2025-04-28 RX ORDER — CALCIUM CARBONATE 500 MG/1
2 TABLET, CHEWABLE ORAL DAILY PRN
COMMUNITY

## 2025-04-28 RX ORDER — ATORVASTATIN CALCIUM 20 MG/1
20 TABLET, FILM COATED ORAL NIGHTLY
COMMUNITY

## 2025-04-28 RX ORDER — AMLODIPINE BESYLATE 2.5 MG/1
2.5 TABLET ORAL DAILY
COMMUNITY

## 2025-04-30 ENCOUNTER — HOSPITAL ENCOUNTER (OUTPATIENT)
Age: 75
Setting detail: OUTPATIENT SURGERY
Discharge: HOME | End: 2025-04-30
Attending: INTERNAL MEDICINE | Admitting: INTERNAL MEDICINE
Payer: MEDICARE

## 2025-04-30 ENCOUNTER — ANESTHESIA (OUTPATIENT)
Dept: ENDOSCOPY | Age: 75
End: 2025-04-30
Payer: MEDICARE

## 2025-04-30 ENCOUNTER — ANESTHESIA EVENT (OUTPATIENT)
Dept: ENDOSCOPY | Age: 75
End: 2025-04-30
Payer: MEDICARE

## 2025-04-30 VITALS
HEART RATE: 68 BPM | TEMPERATURE: 98.6 F | SYSTOLIC BLOOD PRESSURE: 129 MMHG | BODY MASS INDEX: 17.24 KG/M2 | RESPIRATION RATE: 22 BRPM | WEIGHT: 101 LBS | OXYGEN SATURATION: 100 % | DIASTOLIC BLOOD PRESSURE: 62 MMHG | HEIGHT: 64 IN

## 2025-04-30 LAB
BUN BLD-MCNC: 4 MG/DL (ref 8–26)
EGFR, POC: >90 ML/MIN/1.73M2
GLUCOSE BLD-MCNC: 98 MG/DL (ref 74–100)
POC CREATININE: 0.7 MG/DL (ref 0.51–1.19)

## 2025-04-30 PROCEDURE — 82565 ASSAY OF CREATININE: CPT

## 2025-04-30 PROCEDURE — 6360000002 HC RX W HCPCS: Performed by: NURSE ANESTHETIST, CERTIFIED REGISTERED

## 2025-04-30 PROCEDURE — 82947 ASSAY GLUCOSE BLOOD QUANT: CPT

## 2025-04-30 PROCEDURE — G0121 COLON CA SCRN NOT HI RSK IND: HCPCS | Performed by: INTERNAL MEDICINE

## 2025-04-30 PROCEDURE — 2580000003 HC RX 258: Performed by: NURSE ANESTHETIST, CERTIFIED REGISTERED

## 2025-04-30 PROCEDURE — 2500000003 HC RX 250 WO HCPCS: Performed by: NURSE ANESTHETIST, CERTIFIED REGISTERED

## 2025-04-30 PROCEDURE — 7100000010 HC PHASE II RECOVERY - FIRST 15 MIN: Performed by: INTERNAL MEDICINE

## 2025-04-30 PROCEDURE — 84520 ASSAY OF UREA NITROGEN: CPT

## 2025-04-30 PROCEDURE — 7100000011 HC PHASE II RECOVERY - ADDTL 15 MIN: Performed by: INTERNAL MEDICINE

## 2025-04-30 PROCEDURE — 3609027000 HC COLONOSCOPY: Performed by: INTERNAL MEDICINE

## 2025-04-30 PROCEDURE — 3700000000 HC ANESTHESIA ATTENDED CARE: Performed by: INTERNAL MEDICINE

## 2025-04-30 PROCEDURE — 2709999900 HC NON-CHARGEABLE SUPPLY: Performed by: INTERNAL MEDICINE

## 2025-04-30 PROCEDURE — 3700000001 HC ADD 15 MINUTES (ANESTHESIA): Performed by: INTERNAL MEDICINE

## 2025-04-30 RX ORDER — ONDANSETRON 2 MG/ML
4 INJECTION INTRAMUSCULAR; INTRAVENOUS
Status: DISCONTINUED | OUTPATIENT
Start: 2025-04-30 | End: 2025-04-30 | Stop reason: HOSPADM

## 2025-04-30 RX ORDER — PROPOFOL 10 MG/ML
INJECTION, EMULSION INTRAVENOUS
Status: DISCONTINUED | OUTPATIENT
Start: 2025-04-30 | End: 2025-04-30 | Stop reason: SDUPTHER

## 2025-04-30 RX ORDER — SODIUM CHLORIDE 0.9 % (FLUSH) 0.9 %
5-40 SYRINGE (ML) INJECTION PRN
Status: DISCONTINUED | OUTPATIENT
Start: 2025-04-30 | End: 2025-04-30 | Stop reason: HOSPADM

## 2025-04-30 RX ORDER — LIDOCAINE HYDROCHLORIDE 10 MG/ML
INJECTION, SOLUTION EPIDURAL; INFILTRATION; INTRACAUDAL; PERINEURAL
Status: DISCONTINUED | OUTPATIENT
Start: 2025-04-30 | End: 2025-04-30 | Stop reason: SDUPTHER

## 2025-04-30 RX ORDER — DEXMEDETOMIDINE HYDROCHLORIDE 100 UG/ML
INJECTION, SOLUTION INTRAVENOUS
Status: DISCONTINUED | OUTPATIENT
Start: 2025-04-30 | End: 2025-04-30 | Stop reason: SDUPTHER

## 2025-04-30 RX ORDER — SODIUM CHLORIDE 9 MG/ML
INJECTION, SOLUTION INTRAVENOUS PRN
Status: DISCONTINUED | OUTPATIENT
Start: 2025-04-30 | End: 2025-04-30 | Stop reason: HOSPADM

## 2025-04-30 RX ORDER — PHENYLEPHRINE HCL IN 0.9% NACL 1 MG/10 ML
SYRINGE (ML) INTRAVENOUS
Status: DISCONTINUED | OUTPATIENT
Start: 2025-04-30 | End: 2025-04-30 | Stop reason: SDUPTHER

## 2025-04-30 RX ORDER — SODIUM CHLORIDE, SODIUM LACTATE, POTASSIUM CHLORIDE, CALCIUM CHLORIDE 600; 310; 30; 20 MG/100ML; MG/100ML; MG/100ML; MG/100ML
INJECTION, SOLUTION INTRAVENOUS
Status: DISCONTINUED | OUTPATIENT
Start: 2025-04-30 | End: 2025-04-30 | Stop reason: SDUPTHER

## 2025-04-30 RX ORDER — SODIUM CHLORIDE 0.9 % (FLUSH) 0.9 %
5-40 SYRINGE (ML) INJECTION EVERY 12 HOURS SCHEDULED
Status: DISCONTINUED | OUTPATIENT
Start: 2025-04-30 | End: 2025-04-30 | Stop reason: HOSPADM

## 2025-04-30 RX ADMIN — Medication 100 MCG: at 11:28

## 2025-04-30 RX ADMIN — LIDOCAINE HYDROCHLORIDE 50 MG: 10 INJECTION, SOLUTION EPIDURAL; INFILTRATION; INTRACAUDAL; PERINEURAL at 11:21

## 2025-04-30 RX ADMIN — Medication 100 MCG: at 11:35

## 2025-04-30 RX ADMIN — PROPOFOL 50 MG: 10 INJECTION, EMULSION INTRAVENOUS at 11:25

## 2025-04-30 RX ADMIN — DEXMEDETOMIDINE HYDROCHLORIDE 8 MCG: 100 INJECTION, SOLUTION INTRAVENOUS at 11:21

## 2025-04-30 RX ADMIN — PROPOFOL 100 MCG/KG/MIN: 10 INJECTION, EMULSION INTRAVENOUS at 11:21

## 2025-04-30 RX ADMIN — PROPOFOL 50 MG: 10 INJECTION, EMULSION INTRAVENOUS at 11:23

## 2025-04-30 RX ADMIN — SODIUM CHLORIDE, SODIUM LACTATE, POTASSIUM CHLORIDE, CALCIUM CHLORIDE: 600; 310; 30; 20 INJECTION, SOLUTION INTRAVENOUS at 11:18

## 2025-04-30 RX ADMIN — DEXMEDETOMIDINE HYDROCHLORIDE 4 MCG: 100 INJECTION, SOLUTION INTRAVENOUS at 11:23

## 2025-04-30 NOTE — ANESTHESIA PRE PROCEDURE
Neuro/Psych:   (+) depression/anxiety              ROS comment: Hx of alcohol abuse GI/Hepatic/Renal:        (-) liver disease and no renal disease       Endo/Other:        (-) diabetes mellitus               Abdominal:       Abdomen: soft.      Vascular:          Other Findings:             Anesthesia Plan      MAC     ASA 2       Induction: intravenous.    MIPS: Postoperative opioids intended and Prophylactic antiemetics administered.  Anesthetic plan and risks discussed with patient.      Plan discussed with CRNA.                    Ramandeep Marie MD   4/30/2025

## 2025-04-30 NOTE — OP NOTE
Operative Note      Patient: Camille Fontanez  YOB: 1950  MRN: [id number]    Date of Procedure: 4/30/2025    Pre-Op Diagnosis Codes:      * Screening for colon cancer [Z12.11]    Post-Op Diagnosis:  Scattered ascending colon diverticulosis, internal hemorrhoids.       Procedure(s):  COLORECTAL CANCER SCREENING, NOT HIGH RISK    Surgeon(s):  Artemio Francis MD    Assistant:   * No surgical staff found *    Anesthesia: Monitor Anesthesia Care    Estimated Blood Loss (mL): Minimal    Complications: None    Specimens:   * No specimens in log *    Implants:  * No implants in log *      Drains: * No LDAs found *    Findings:  Infection Present At Time Of Surgery (PATOS) (choose all levels that have infection present):  No infection present      Scope withdrawal time: 9 min     Description of Procedure:  Informed consent was obtained from the patient after explanation of the procedure including indications, description of the procedure,  benefits and possible risks and complications of the procedure, and alternatives. Questions were answered.  The patient's history was reviewed and a directed physical examination was performed prior to the procedure.    Patient was monitored throughout the procedure with pulse oximetry and periodic assessment of vital signs. Patient was sedated as noted above. With the patient initially in the left lateral decubitus position, a digital rectal examination was performed and revealed negative without mass, lesions or tenderness.  The Olympus video colonoscope was placed in the patient's rectum and advanced without difficulty  to the cecum, which was identified by the ileocecal valve and appendiceal orifice.  The prep was good.  Examination of the mucosa was performed during both introduction and withdrawal of the colonoscope. Retroflexed view of the rectum was performed.  The patient  was taken to the recovery area in good condition.     Findings:     1.  Scattered

## 2025-04-30 NOTE — DISCHARGE INSTRUCTIONS
[facility]    POST-ENDOSCOPY INSTRUCTIONS    1. ACTIVITY   No driving, operating machinery, no alcohol or making important decisions for 24 hours.    Resume normal activity after 24 hours.  You may return to work after 24 hours.    2. DIET        Resume your usual diet unless specified below.        3. MEDICATIONS    Resume your usual medications.     Do not consume alcohol, tranquilizers, or sleeping medications for 24 hour unless advised by your physician.                 4. PHYSICIAN FOLLOW-UP / INSTRUCTIONS    Please call the office/clinic in 10 days for biopsy results:      [  ] GI office:  [phone] option #2          Follow up with your family physician as planned.    6. NORMAL CHANGES YOU MAY EXPERIENCE AFTER ENDOSCOPY:         COLONOSCOPY      Passing of gas for several hours.      Some mild abdominal cramping.               You may feel fatigued for the next 24-48   hours due to the prep and sedation    7. CALL YOUR PHYSICIAN IF YOU EXPERIENCE ANY OF THE FOLLOWING      A.  Passing blood rectally or vomiting blood (color may be red or black)      B.  Severe abdominal pain or tenderness (that is not relieved by passing air)      C.  Fever, chills, or excessive sweating      D.  Persistent nausea or vomiting      E.  Redness or swelling at the IV site    If you have additional questions, PLEASE call your doctor or the [facility] GI Unit ([phone] option #2)

## 2025-04-30 NOTE — H&P
History and Physical    Pt Name: Camille Fontanez  MRN: [id number]  YOB: 1950  Date of evaluation: 4/30/2025  Primary Care Physician: Mabel Nicole CNNP    SUBJECTIVE:   History of Chief Complaint:    Camille Fontanez is a 74 y.o. female who is scheduled today for COLORECTAL CANCER SCREENING, NOT HIGH RISK. Patient reports she had a positive cologuard but she believes it was due to constipation. She denies prior colonoscopy. She denies family history of colorectal cancer. She reports a 24 lb unintentional weight loss in the past 2 months, she reports a decreased appetite.   Allergies  is allergic to codeine.  Medications  Prior to Admission medications    Medication Sig Start Date End Date Taking? Authorizing Provider   Multiple Vitamins-Minerals (CENTRUM SILVER ADULT 50+) TABS Take 1 tablet by mouth daily   Yes Alex Mota MD   amLODIPine (NORVASC) 2.5 MG tablet Take 1 tablet by mouth daily   Yes Alex Mota MD   megestrol (MEGACE) 20 MG tablet Take 1 tablet by mouth 4 times daily   Yes Alex Mota MD   atorvastatin (LIPITOR) 20 MG tablet Take 1 tablet by mouth nightly   Yes Alex Mota MD   calcium carbonate 600 MG TABS tablet Take 1 tablet by mouth daily   Yes Alex Mota MD   vitamin D (VITAMIN D3) 25 MCG (1000 UT) CAPS Take 820 Int'l Units by mouth Daily Patient states dose is 820   Yes Alex Mota MD   ALBUTEROL SULFATE IN Inhale 2 puffs into the lungs every 4-6 hours as needed   Yes Alex Mota MD   albuterol (ACCUNEB) 0.63 MG/3ML nebulizer solution Take 3 mLs by nebulization every 6 hours as needed for Wheezing   Yes Alex Mota MD   calcium carbonate (TUMS) 500 MG chewable tablet Take 2 tablets by mouth daily as needed for Heartburn   Yes Alex Mota MD   bisacodyl 5 MG EC tablet Take as directed by physician office for colonoscopy prep 4/10/25   Artemio Francis MD   polyethylene

## 2025-05-02 ENCOUNTER — TELEPHONE (OUTPATIENT)
Dept: GASTROENTEROLOGY | Age: 75
End: 2025-05-02

## 2025-05-02 NOTE — TELEPHONE ENCOUNTER
----- Message from Dr. Artemio Francis MD sent at 4/30/2025 11:38 AM EDT -----  Repeat colonoscopy in 10 years    10 yr colon recall encountered

## 2025-05-05 NOTE — ANESTHESIA POSTPROCEDURE EVALUATION
Department of Anesthesiology  Postprocedure Note    Patient: Camille Fontanez  MRN: [id number]  YOB: 1950  Date of evaluation: 5/5/2025    Procedure Summary       Date: 04/30/25 Room / Location: [place] [address] / [facility]    Anesthesia Start: 1118 Anesthesia Stop: 1146    Procedure: COLORECTAL CANCER SCREENING, NOT HIGH RISK Diagnosis:       Screening for colon cancer      (Screening for colon cancer [Z12.11])    Surgeons: Artemio Francis MD Responsible Provider: Ramandeep Marie MD    Anesthesia Type: MAC ASA Status: 2            Anesthesia Type: No value filed.    Lraissa Phase I:      Larissa Phase II: Larissa Score: 10    Anesthesia Post Evaluation    Patient location during evaluation: PACU  Patient participation: complete - patient participated  Level of consciousness: awake  Airway patency: patent  Nausea & Vomiting: no nausea and no vomiting  Cardiovascular status: blood pressure returned to baseline  Respiratory status: acceptable  Hydration status: euvolemic  Pain management: adequate        No notable events documented.

## 2025-05-10 PROBLEM — Z12.11 SCREENING FOR COLON CANCER: Status: RESOLVED | Noted: 2025-04-10 | Resolved: 2025-05-10

## 2025-07-31 ENCOUNTER — APPOINTMENT (OUTPATIENT)
Dept: GENERAL RADIOLOGY | Age: 75
End: 2025-07-31
Payer: MEDICARE

## 2025-07-31 ENCOUNTER — HOSPITAL ENCOUNTER (EMERGENCY)
Age: 75
Discharge: HOME | End: 2025-07-31
Attending: EMERGENCY MEDICINE
Payer: MEDICARE

## 2025-07-31 VITALS
OXYGEN SATURATION: 100 % | WEIGHT: 109 LBS | DIASTOLIC BLOOD PRESSURE: 66 MMHG | TEMPERATURE: 98.2 F | RESPIRATION RATE: 20 BRPM | SYSTOLIC BLOOD PRESSURE: 137 MMHG | HEIGHT: 64 IN | BODY MASS INDEX: 18.61 KG/M2 | HEART RATE: 70 BPM

## 2025-07-31 DIAGNOSIS — F41.1 ANXIETY STATE: Primary | ICD-10-CM

## 2025-07-31 LAB
ALBUMIN SERPL-MCNC: 4 G/DL (ref 3.5–5.2)
ALBUMIN/GLOB SERPL: 1.4 {RATIO} (ref 1–2.5)
ALP SERPL-CCNC: 48 U/L (ref 35–104)
ALT SERPL-CCNC: 14 U/L (ref 10–35)
ANION GAP SERPL CALCULATED.3IONS-SCNC: 11 MMOL/L (ref 9–16)
AST SERPL-CCNC: 25 U/L (ref 10–35)
BASOPHILS # BLD: <0.03 K/UL (ref 0–0.2)
BASOPHILS NFR BLD: 0 % (ref 0–2)
BILIRUB SERPL-MCNC: 0.5 MG/DL (ref 0–1.2)
BUN SERPL-MCNC: 4 MG/DL (ref 8–23)
CALCIUM SERPL-MCNC: 9 MG/DL (ref 8.6–10.4)
CHLORIDE SERPL-SCNC: 106 MMOL/L (ref 98–107)
CO2 SERPL-SCNC: 25 MMOL/L (ref 20–31)
CREAT SERPL-MCNC: 0.7 MG/DL (ref 0.6–0.9)
EOSINOPHIL # BLD: 0.2 K/UL (ref 0–0.44)
EOSINOPHILS RELATIVE PERCENT: 4 % (ref 1–4)
ERYTHROCYTE [DISTWIDTH] IN BLOOD BY AUTOMATED COUNT: 14.3 % (ref 11.8–14.4)
GFR, ESTIMATED: >90 ML/MIN/1.73M2
GLUCOSE SERPL-MCNC: 95 MG/DL (ref 74–99)
HCT VFR BLD AUTO: 35.1 % (ref 36.3–47.1)
HGB BLD-MCNC: 11.4 G/DL (ref 11.9–15.1)
IMM GRANULOCYTES # BLD AUTO: <0.03 K/UL (ref 0–0.3)
IMM GRANULOCYTES NFR BLD: 0 %
LYMPHOCYTES NFR BLD: 2.29 K/UL (ref 1.1–3.7)
LYMPHOCYTES RELATIVE PERCENT: 45 % (ref 24–43)
MCH RBC QN AUTO: 30.5 PG (ref 25.2–33.5)
MCHC RBC AUTO-ENTMCNC: 32.5 G/DL (ref 28.4–34.8)
MCV RBC AUTO: 93.9 FL (ref 82.6–102.9)
MONOCYTES NFR BLD: 0.29 K/UL (ref 0.1–1.2)
MONOCYTES NFR BLD: 6 % (ref 3–12)
NEUTROPHILS NFR BLD: 45 % (ref 36–65)
NEUTS SEG NFR BLD: 2.32 K/UL (ref 1.5–8.1)
NRBC BLD-RTO: 0 PER 100 WBC
PLATELET # BLD AUTO: 389 K/UL (ref 138–453)
PMV BLD AUTO: 8.9 FL (ref 8.1–13.5)
POTASSIUM SERPL-SCNC: 3.4 MMOL/L (ref 3.7–5.3)
PROT SERPL-MCNC: 6.9 G/DL (ref 6.6–8.7)
RBC # BLD AUTO: 3.74 M/UL (ref 3.95–5.11)
SODIUM SERPL-SCNC: 142 MMOL/L (ref 136–145)
TROPONIN I SERPL HS-MCNC: <6 NG/L (ref 0–14)
TROPONIN I SERPL HS-MCNC: <6 NG/L (ref 0–14)
WBC OTHER # BLD: 5.1 K/UL (ref 3.5–11.3)

## 2025-07-31 PROCEDURE — 93005 ELECTROCARDIOGRAM TRACING: CPT

## 2025-07-31 PROCEDURE — 85025 COMPLETE CBC W/AUTO DIFF WBC: CPT

## 2025-07-31 PROCEDURE — 71045 X-RAY EXAM CHEST 1 VIEW: CPT

## 2025-07-31 PROCEDURE — 80053 COMPREHEN METABOLIC PANEL: CPT

## 2025-07-31 PROCEDURE — 99285 EMERGENCY DEPT VISIT HI MDM: CPT

## 2025-07-31 PROCEDURE — 84484 ASSAY OF TROPONIN QUANT: CPT

## 2025-07-31 PROCEDURE — 6370000000 HC RX 637 (ALT 250 FOR IP)

## 2025-07-31 RX ORDER — HYDROXYZINE HYDROCHLORIDE 10 MG/1
10 TABLET, FILM COATED ORAL ONCE
Status: COMPLETED | OUTPATIENT
Start: 2025-07-31 | End: 2025-07-31

## 2025-07-31 RX ADMIN — HYDROXYZINE HYDROCHLORIDE 10 MG: 10 TABLET ORAL at 09:30

## 2025-07-31 RX ADMIN — POTASSIUM BICARBONATE 40 MEQ: 782 TABLET, EFFERVESCENT ORAL at 11:00

## 2025-07-31 NOTE — ED PROVIDER NOTES
[facility] EMERGENCY DEPARTMENT     Emergency Department     Faculty Attestation    I performed a history and physical examination of the patient and discussed management with the resident. I reviewed the resident’s note and agree with the documented findings and plan of care. Any areas of disagreement are noted on the chart. I was personally present for the key portions of any procedures. I have documented in the chart those procedures where I was not present during the key portions. I have reviewed the emergency nurses triage note. I agree with the chief complaint, past medical history, past surgical history, allergies, medications, social and family history as documented unless otherwise noted below. For Physician Assistant/ Nurse Practitioner cases/documentation I have personally evaluated this patient and have completed at least one if not all key elements of the E/M (history, physical exam, and MDM). Additional findings are as noted.    Note Started: 9:13 AM EDT    Patient is by EMS resident pending history for grief reaction.  States her nephew came in last night as a drug overdose and she was on the way to the hospital this morning to visit when she received a call that he had  in the ICU.  Patient is tearful upset does complain of chest heaviness denies any heart history.  No symptoms prior to receiving this terrible news this morning.  On exam tearful but nontoxic chatting with the nurse.  Lungs clear equal heart regular equal pulses abdomen soft nontender.  Will check cardiac workup given age social work to see for support and resources, possible discharge    EKG interpretation: sinus rhythm 66, normal intervals, normal axis, no acute ST or T changes.      Critical Care     none    Maninder Blakely MD, FACEP  Attending Emergency  Physician           Maninder Blakely MD  25 0745    
    Medical Decision Making  74-year-old female presents to the emergency department with anxiety/grief state following receiving the news of family members death this morning.  No suicidal ideation.  On exam patient is tearful and anxious but engages appropriately in conversation with me.  Vital signs are within normal limits.  Patient did not have chest pain and pressure before she received this news.  Plan for cardiac workup.  Will have social work see the patient for mental health resources.  Anticipate discharge home if cardiac workup is negative.     Amount and/or Complexity of Data Reviewed  Labs: ordered. Decision-making details documented in ED Course.  Radiology: ordered. Decision-making details documented in ED Course.  ECG/medicine tests: ordered.    Risk  Prescription drug management.        EKG  EKG Interpretation    Interpreted by emergency department physician    Rhythm: normal sinus   Rate: normal  Axis: normal  Ectopy: none  Conduction: normal  ST Segments: no acute change  T Waves: no acute change  Q Waves: none    Clinical Impression: no acute changes    Carrie Bour, DO    All EKG's are interpreted by the Emergency Department Physician who either signs or Co-signs this chart in the absence of a cardiologist.      EMERGENCY DEPARTMENT COURSE:    ED Course as of 07/31/25 1847   Thu Jul 31, 2025   0952 CBC with Auto Differential(!):    WBC 5.1   RBC 3.74(!)   Hemoglobin Quant 11.4(!)   Hematocrit 35.1(!)   MCV 93.9   MCH 30.5   MCHC 32.5   RDW 14.3   Platelet Count 389   MPV 8.9   NRBC Automated 0.0   Neutrophils % 45   Lymphocyte % 45(!)   Monocytes % 6   Eosinophils % 4   Basophils % 0   Immature Granulocytes % 0   Neutrophils Absolute 2.32   Lymphocytes Absolute 2.29   Monocytes Absolute 0.29   Eosinophils Absolute 0.20   Basophils Absolute <0.03   Immature Granulocytes Absolute <0.03  Similar to baseline. [MB]   1052 Potassium(!): 3.4  Will replace [MB]   1105 XR CHEST PORTABLE  IMPRESSION:  No

## 2025-07-31 NOTE — ED NOTES
Patient's nephew passed away this morning. Patient stated nephew who was like a son to her, had a hx of heroin use, recently had a stroke and started drinking alcohol. Patient stated she warned patient to stop drinking but he didn't listen. Patient stated her nephew went out onto the porch when someone pulled up and left shortly after. Patient stated she was concerned about patient when he didn't come back in, checked on him and found he had overdosed. Patient stated EMS did two rounds of CPR, was brought into the hospital, then passed away early this morning. Patient was tearful and verbalized her upset on how he passed away. Patient stated if he listened, he wouldn't have passed away like this.     Patient stated she is waiting for her family members to arrive and plans to see nephew if possible after her discharge. Patient stated she goes to [facility] for psychiatric needs and can speak with them about her grief.

## 2025-07-31 NOTE — DISCHARGE INSTRUCTIONS
You were seen in the emerged department today for anxiety.  We did a cardiac workup and you had a normal EKG and normal troponins which are a cardiac enzyme.  You did have mildly low potassium which we gave you replacement for.  You should follow-up with your primary care provider for this.    Follow-up with your primary care provider and

## 2025-07-31 NOTE — ED NOTES
Patient arrives to ED via EMS after finding out her son passed away. Per EMS patient was having an anxiety attack so family called 911. Patient is A&O x4, respirations even and unlabored. Patient is tearful on arrival. Patient denies any chest pain, abdominal pain. Patient states they have had anxiety attacks in the past. Patient's vitals taken. Call light given and within reach.

## 2025-08-01 LAB
EKG ATRIAL RATE: 66 BPM
EKG P AXIS: 79 DEGREES
EKG P-R INTERVAL: 156 MS
EKG Q-T INTERVAL: 406 MS
EKG QRS DURATION: 70 MS
EKG QTC CALCULATION (BAZETT): 425 MS
EKG R AXIS: 71 DEGREES
EKG T AXIS: 71 DEGREES
EKG VENTRICULAR RATE: 66 BPM

## (undated) DEVICE — SYRINGE MED 30ML SLIP TIP BLNT FILL AND LUERLOCK DISP